# Patient Record
Sex: MALE
[De-identification: names, ages, dates, MRNs, and addresses within clinical notes are randomized per-mention and may not be internally consistent; named-entity substitution may affect disease eponyms.]

---

## 2017-05-27 NOTE — CT
EXAM:

  CT Head Without Intravenous Contrast



CLINICAL HISTORY:

  47 years old, male; Injury or trauma; Fall; Initial encounter; Concussion / 

head injury; Without loss of consciousness; Additional info: (+) ETOH, fall, 

head injury



TECHNIQUE:

  Axial computed tomography images of the head/brain without intravenous 

contrast.  This CT exam was performed using one or more of the following dose 

reduction techniques:  automated exposure control, adjustment of the mA and/or 

kV according to patient size, and/or use of iterative reconstruction technique.

  Coronal and sagittal reformatted images were created and reviewed.



EXAM DATE/TIME:

  Exam ordered 5/27/2017 2:02 AM



COMPARISON:

  No relevant prior studies available.



FINDINGS:

  Brain:  No intracranial hemorrhage.  No significant white matter disease.  No 

edema.

  Ventricles:  Unremarkable.  No ventriculomegaly.

  Bones/joints:  Included portions of the mandible appear intact.  No acute 

fracture.

  Soft tissues: Left frontal extracranial soft tissue swelling.  Question 

prominent left parotid.

  Sinuses:  Paranasal sinuses appear clear.

  Mastoid air cells:  Mastoid air cells appear clear.



IMPRESSION:     

  No fractures, no intracranial hemorrhage.



Physical examination correlation.

## 2017-05-27 NOTE — ED PDOC
HPI: Psych/Substance Abuse


Time Seen by Provider: 05/27/17 01:47


Chief Complaint (Nursing): Alcohol Ingestion


Chief Complaint (Provider): ETOH


History Per: Patient


Additional Complaint(s): 


47 y/o male with PMHx of diastolic CHF, hyperlipidemia, HTN, alcoholic cirrhosis

, schizophrenia and PVD presents to ED after drinking tonight and falling onto 

his face. No LOC. Multiple abrasions noted to face. 











Past Medical History


Reviewed: Historical Data, Nursing Documentation, Vital Signs


Vital Signs: 





 Last Vital Signs











Temp  98.2 F   05/27/17 01:48


 


Pulse  89   05/27/17 01:48


 


Resp  16   05/27/17 01:48


 


BP  157/86 H  05/27/17 01:48


 


Pulse Ox  96   05/27/17 01:48














- Medical History


PMH: Anxiety, Asthma, Bipolar Disorder, CHF, COPD (ASTHMA), Depression, HTN, 

Schizophrenia


   Denies: Alzheimer's Disease, Arthritis, Atrial Fibrillation, Bronchitis, 

Cardia Arrhythmia, Dementia, Emphysema, Hypercholesterolemia, Hypothyroidism, 

Migraine, Mitral Valve Prolapse, Multiple Sclerosis, Parkinson's Disease, 

Peripheral Edema, Pneumonia, Pulmonary Embolism, Chronic Kidney Disease, 

Rheumatoid Arthritis, Seizures, Sleep Apnea, TIA





- Surgical History


Surgical History: Appendectomy


   Denies: Pacemaker





- Family History


Family History: States: Unknown Family Hx





- Social History


Alcohol: > 2 Drinks/Day





- Immunization History


Hx Tetanus Toxoid Vaccination: No


Hx Influenza Vaccination: Yes


Hx Pneumococcal Vaccination: No





- Home Medications


Home Medications: 


 Ambulatory Orders











 Medication  Instructions  Recorded


 


ARIPiprazole [Abilify] 5 mg PO DAILY #0 tab 03/25/16


 


Folic Acid 1 mg PO DAILY #0 tab 03/25/16


 


Lurasidone HCl [Latuda] 40 mg PO HS #0 tablet 03/25/16


 


Midodrine [Proamatine] 5 mg PO TID #0 tab 03/25/16


 


Omeprazole 40 mg PO DAILY #0 capsule. 03/25/16


 


Thiamine [Vitamin B1 Tab] 100 mg PO DAILY #0 tab 03/25/16


 


Aspirin [Aspirin Chewable] 81 mg PO DAILY #30 chew 05/27/16


 


Cilostazol 100 mg PO BID #60 tablet 05/27/16


 


Multivitamins [Hexavitamin] 1 tab PO DAILY #0 tab 05/27/16


 


Spironolactone [Aldactone] 25 mg PO BID #60 tab 05/27/16














- Allergies


Allergies/Adverse Reactions: 


 Allergies











Allergy/AdvReac Type Severity Reaction Status Date / Time


 


No Known Allergies Allergy   Verified 02/22/17 09:10














Review of Systems


ROS Statement: Except As Marked, All Systems Reviewed And Found Negative


Skin: Positive for: Other (abrasions)





Physical Exam





- Reviewed


Nursing Documentation Reviewed: Yes


Vital Signs Reviewed: Yes





- Physical Exam


Appears: Positive for: Well, Non-toxic, No Acute Distress


Head Exam: Positive for: ATRAUMATIC, NORMAL INSPECTION, NORMOCEPHALIC


Skin: Positive for: Normal Color, Warm


Eye Exam: Positive for: EOMI, Normal appearance, PERRL


ENT: Positive for: Normal ENT Inspection, Other (4 cm laceration to upper lip, 

not extending through vermillion boarder.)


Neck: Positive for: Normal, Painless ROM


Cardiovascular/Chest: Positive for: Regular Rate, Rhythm


Respiratory: Positive for: CNT, Normal Breath Sounds


Gastrointestinal/Abdominal: Positive for: Normal Exam, Bowel Sounds, Soft


Back: Positive for: Normal Inspection


Extremity: Positive for: Normal ROM


Neurologic/Psych: Positive for: Alert, Oriented


Comments: 





multiple superficial abrasion to forehead, nasal bridge and cheeks





- ECG


O2 Sat by Pulse Oximetry: 96





Medical Decision Making


Medical Decision Making: 





Head and Maxillofaial CT: Negative, as per [facility]





Facial abrasions cleaned and dressed by writer. Lip laceration repaired, see 

procedure note.











Disposition





- Clinical Impression


Clinical Impression: 


 Alcohol use, Facial laceration, Head injury








- Patient ED Disposition


Is Patient to be Admitted: No





- Disposition


Disposition: Routine/Home


Disposition Time: 05:21


Condition: STABLE


Instructions:  Laceration (ED), Head Injury (ED), Alcohol Intoxication (ED)





Laceration





- Laceration Repair


  ** laceration


Wound Length (In cm): 4


Description Of Wound: Irregular


Wound Cleansed With: Sterile Saline


Anesthesia: Lidocaine 1%


Wound Examination: Irrigated With Saline


Wound Closure: Suture


Suture Technique And Material Used: Vicryl (5-0)


Wound Complexity: Simple

## 2017-05-27 NOTE — ED PDOC
- Laboratory Results


Result Diagrams: 


 05/27/17 11:53





 05/27/17 11:53





- ECG


O2 Sat by Pulse Oximetry: 97





- Progress


Re-evaluation Time: 14:19


Condition: Improved (Awake alert oriented x 3 no focal neuro deficit. Able to 

ambulate. Requesting to be discharged.)





Disposition





- Clinical Impression


Clinical Impression: 


 Alcohol use, Facial laceration, Head injury, Alcohol intoxication








- POA


Present On Arrival: None





- Disposition


Disposition: Routine/Home


Disposition Time: 14:20


Condition: FAIR


Instructions:  Laceration (ED), Head Injury (ED), Alcohol Intoxication (ED)


Print Language: Japanese

## 2017-05-27 NOTE — CT
PROCEDURE:  CT scan maxillofacial skeleton dated 05/27/2017 



HISTORY:

(+) ETOH,. Status post fall.  Head injury. 



COMPARISON:

Comparison made with concurrent CT scan brain 05/27/2017



TECHNIQUE:

Contiguous helical/ transaxial CT  images of the maxillofacial bones 

were obtained. Coronal and sagittal reformats were generated.



Radiation dose:



Total exam DLP = 815.02  mGy-cm.



This CT exam was performed using one or more of the following dose 

reduction techniques: Automated exposure control, adjustment of the 

mA and/or kV according to patient size, and/or use of iterative 

reconstruction technique. 



Findings: 



The current study reveals no definitive evidence of acute 

maxillofacial skeletal fractures. The osseous structures appear 

intact so far as can be seen of.  There is mild soft tissue swelling 

over the left supraorbital and left frontal region. There may also be 

some mild soft tissue swelling over the bridge of the nose, more so 

on the right side. 



The bony orbits are intact.  Globes intact and lenses appropriately 

located.  There are no retrobulbar hemorrhages or collections. Optic 

nerves and extraocular musculature unremarkable. 



The visualized paranasal sinuses well-developed and currently 

well-aerated. No fluid levels seen to suggest acute hemorrhage or 

sinusitis.  Mild mucosal thickening seen in the maxillary antra right 

greater than left. Minor mucosal thickening noted within a few 

ethmoid air cells 



There is mild focal deviation of the nasal septum from left to right. 

Nasal bone and anterior and nasal spine of the maxilla is intact. 



The mandible is intact. 



Impression: No evidence of acute fractures. 



Mild soft tissue swelling left supraorbital and left frontal region 

all with what appears represent small amount of soft tissue swelling 

over the bridge of the nose more so on the right. 



See above discussion for additional incidental details and findings. 

.

## 2017-08-23 ENCOUNTER — HOSPITAL ENCOUNTER (OUTPATIENT)
Dept: HOSPITAL 31 - C.ER | Age: 47
Setting detail: OBSERVATION
LOS: 1 days | Discharge: HOME | End: 2017-08-24
Attending: EMERGENCY MEDICINE | Admitting: EMERGENCY MEDICINE
Payer: MEDICARE

## 2017-08-23 VITALS — BODY MASS INDEX: 47 KG/M2

## 2017-08-23 DIAGNOSIS — F10.120: Primary | ICD-10-CM

## 2017-08-23 DIAGNOSIS — T14.8: ICD-10-CM

## 2017-08-23 DIAGNOSIS — I50.9: ICD-10-CM
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
## 2017-08-23 DIAGNOSIS — F31.9: ICD-10-CM
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
## 2017-08-23 DIAGNOSIS — I11.0: ICD-10-CM

## 2017-08-23 DIAGNOSIS — J44.9: ICD-10-CM

## 2017-08-23 PROCEDURE — 80053 COMPREHEN METABOLIC PANEL: CPT

## 2017-08-23 PROCEDURE — 12004 RPR S/N/AX/GEN/TRK7.6-12.5CM: CPT

## 2017-08-23 PROCEDURE — 85025 COMPLETE CBC W/AUTO DIFF WBC: CPT

## 2017-08-23 PROCEDURE — 94770: CPT

## 2017-08-23 PROCEDURE — 81001 URINALYSIS AUTO W/SCOPE: CPT

## 2017-08-23 PROCEDURE — 90471 IMMUNIZATION ADMIN: CPT

## 2017-08-23 PROCEDURE — 70450 CT HEAD/BRAIN W/O DYE: CPT

## 2017-08-23 PROCEDURE — 90715 TDAP VACCINE 7 YRS/> IM: CPT

## 2017-08-24 VITALS
RESPIRATION RATE: 17 BRPM | DIASTOLIC BLOOD PRESSURE: 80 MMHG | HEART RATE: 82 BPM | TEMPERATURE: 97.9 F | SYSTOLIC BLOOD PRESSURE: 120 MMHG | OXYGEN SATURATION: 99 %

## 2017-08-24 LAB
ALBUMIN/GLOB SERPL: 0.5 {RATIO} (ref 1–2.1)
ALP SERPL-CCNC: 249 U/L (ref 38–126)
ALT SERPL-CCNC: 70 U/L (ref 21–72)
AST SERPL-CCNC: 293 U/L (ref 17–59)
BACTERIA #/AREA URNS HPF: (no result) /[HPF]
BASOPHILS # BLD AUTO: 0 K/UL (ref 0–0.2)
BASOPHILS NFR BLD: 0.5 % (ref 0–2)
BILIRUB SERPL-MCNC: 12 MG/DL (ref 0.2–1.3)
BILIRUB UR-MCNC: (no result) MG/DL
BUN SERPL-MCNC: 2 MG/DL (ref 9–20)
CALCIUM SERPL-MCNC: 7.9 MG/DL (ref 8.6–10.4)
CHLORIDE SERPL-SCNC: 97 MMOL/L (ref 98–107)
CO2 SERPL-SCNC: 16 MMOL/L (ref 22–30)
EOSINOPHIL # BLD AUTO: 0 K/UL (ref 0–0.7)
EOSINOPHIL NFR BLD: 0.4 % (ref 0–4)
ERYTHROCYTE [DISTWIDTH] IN BLOOD BY AUTOMATED COUNT: 27.3 % (ref 11.5–14.5)
ETHANOL SERPL-MCNC: 384 MG/DL (ref 0–10)
GLOBULIN SER-MCNC: 5.5 GM/DL (ref 2.2–3.9)
GLUCOSE SERPL-MCNC: 121 MG/DL (ref 75–110)
GLUCOSE UR STRIP-MCNC: NORMAL MG/DL
HCT VFR BLD CALC: 34.8 % (ref 35–51)
KETONES UR STRIP-MCNC: NEGATIVE MG/DL
LEUKOCYTE ESTERASE UR-ACNC: (no result) LEU/UL
LYMPHOCYTES # BLD AUTO: 0.7 K/UL (ref 1–4.3)
LYMPHOCYTES NFR BLD AUTO: 14.4 % (ref 20–40)
MCH RBC QN AUTO: 31.3 PG (ref 27–31)
MCHC RBC AUTO-ENTMCNC: 34 G/DL (ref 33–37)
MCV RBC AUTO: 92 FL (ref 80–94)
MONOCYTES # BLD: 0.9 K/UL (ref 0–0.8)
MONOCYTES NFR BLD: 17.5 % (ref 0–10)
NRBC BLD AUTO-RTO: 0.2 % (ref 0–2)
PH UR STRIP: 7 [PH] (ref 5–8)
PLATELET # BLD: 73 K/UL (ref 130–400)
PMV BLD AUTO: 8.7 FL (ref 7.2–11.7)
POTASSIUM SERPL-SCNC: 3.5 MMOL/L (ref 3.6–5.2)
PROT SERPL-MCNC: 8.1 G/DL (ref 6.3–8.3)
PROT UR STRIP-MCNC: NEGATIVE MG/DL
RBC # UR STRIP: NEGATIVE /UL
RBC #/AREA URNS HPF: 1 /HPF (ref 0–3)
SODIUM SERPL-SCNC: 133 MMOL/L (ref 132–148)
SP GR UR STRIP: 1 (ref 1–1.03)
UROBILINOGEN UR-MCNC: 2 MG/DL (ref 0.2–1)
WBC # BLD AUTO: 5 K/UL (ref 4.8–10.8)
WBC #/AREA URNS HPF: 2 /HPF (ref 0–5)

## 2017-08-27 ENCOUNTER — HOSPITAL ENCOUNTER (INPATIENT)
Dept: HOSPITAL 31 - C.ER | Age: 47
LOS: 9 days | Discharge: HOME | DRG: 433 | End: 2017-09-05
Attending: INTERNAL MEDICINE | Admitting: INTERNAL MEDICINE
Payer: MEDICARE

## 2017-08-27 DIAGNOSIS — Z87.891: ICD-10-CM

## 2017-08-27 DIAGNOSIS — I10: ICD-10-CM

## 2017-08-27 DIAGNOSIS — K70.31: Primary | ICD-10-CM

## 2017-08-27 DIAGNOSIS — N28.1: ICD-10-CM

## 2017-08-27 DIAGNOSIS — I85.00: ICD-10-CM

## 2017-08-27 DIAGNOSIS — E78.5: ICD-10-CM

## 2017-08-27 DIAGNOSIS — E66.01: ICD-10-CM

## 2017-08-27 DIAGNOSIS — K70.11: ICD-10-CM

## 2017-08-27 LAB
ALBUMIN/GLOB SERPL: 0.5 {RATIO} (ref 1–2.1)
ALP SERPL-CCNC: 225 U/L (ref 38–126)
ALT SERPL-CCNC: 68 U/L (ref 21–72)
APTT BLD: 40 SECONDS (ref 21–34)
AST SERPL-CCNC: 367 U/L (ref 17–59)
BASOPHILS # BLD AUTO: 0 K/UL (ref 0–0.2)
BASOPHILS NFR BLD: 0.3 % (ref 0–2)
BILIRUB SERPL-MCNC: 12.6 MG/DL (ref 0.2–1.3)
BUN SERPL-MCNC: 5 MG/DL (ref 9–20)
CALCIUM SERPL-MCNC: 7.9 MG/DL (ref 8.6–10.4)
CHLORIDE SERPL-SCNC: 97 MMOL/L (ref 98–107)
CO2 SERPL-SCNC: 25 MMOL/L (ref 22–30)
EOSINOPHIL # BLD AUTO: 0 K/UL (ref 0–0.7)
EOSINOPHIL NFR BLD: 0.2 % (ref 0–4)
ERYTHROCYTE [DISTWIDTH] IN BLOOD BY AUTOMATED COUNT: 28.9 % (ref 11.5–14.5)
GLOBULIN SER-MCNC: 5.5 GM/DL (ref 2.2–3.9)
GLUCOSE SERPL-MCNC: 98 MG/DL (ref 75–110)
HCT VFR BLD CALC: 30.3 % (ref 35–51)
INR PPP: 2.4
LYMPHOCYTES # BLD AUTO: 0.6 K/UL (ref 1–4.3)
LYMPHOCYTES NFR BLD AUTO: 13.5 % (ref 20–40)
MCH RBC QN AUTO: 31.6 PG (ref 27–31)
MCHC RBC AUTO-ENTMCNC: 33.8 G/DL (ref 33–37)
MCV RBC AUTO: 93.3 FL (ref 80–94)
MONOCYTES # BLD: 0.8 K/UL (ref 0–0.8)
MONOCYTES NFR BLD: 18.7 % (ref 0–10)
NRBC BLD AUTO-RTO: 0.7 % (ref 0–2)
PLATELET # BLD: 86 K/UL (ref 130–400)
PMV BLD AUTO: 8.5 FL (ref 7.2–11.7)
POTASSIUM SERPL-SCNC: 3.6 MMOL/L (ref 3.6–5.2)
PROT SERPL-MCNC: 8.1 G/DL (ref 6.3–8.3)
SODIUM SERPL-SCNC: 135 MMOL/L (ref 132–148)
WBC # BLD AUTO: 4.2 K/UL (ref 4.8–10.8)

## 2017-08-28 LAB
ALBUMIN/GLOB SERPL: 0.5 {RATIO} (ref 1–2.1)
ALP SERPL-CCNC: 217 U/L (ref 38–126)
ALT SERPL-CCNC: 70 U/L (ref 21–72)
AST SERPL-CCNC: 301 U/L (ref 17–59)
BASOPHILS # BLD AUTO: 0 K/UL (ref 0–0.2)
BASOPHILS NFR BLD: 0.7 % (ref 0–2)
BILIRUB DIRECT SERPL-MCNC: 9.4 MG/DL (ref 0–0.4)
BILIRUB SERPL-MCNC: 11.3 MG/DL (ref 0.2–1.3)
BILIRUB UR-MCNC: (no result) MG/DL
BUN SERPL-MCNC: 6 MG/DL (ref 9–20)
CALCIUM SERPL-MCNC: 7.6 MG/DL (ref 8.6–10.4)
CHLORIDE SERPL-SCNC: 98 MMOL/L (ref 98–107)
CO2 SERPL-SCNC: 26 MMOL/L (ref 22–30)
COLOR UR: (no result)
EOSINOPHIL # BLD AUTO: 0 K/UL (ref 0–0.7)
EOSINOPHIL NFR BLD: 1 % (ref 0–4)
ERYTHROCYTE [DISTWIDTH] IN BLOOD BY AUTOMATED COUNT: 28.8 % (ref 11.5–14.5)
ETHANOL SERPL-MCNC: < 10 MG/DL (ref 0–10)
GLOBULIN SER-MCNC: 5.3 GM/DL (ref 2.2–3.9)
GLUCOSE SERPL-MCNC: 82 MG/DL (ref 75–110)
HCT VFR BLD CALC: 28.2 % (ref 35–51)
KETONES UR STRIP-MCNC: (no result) MG/DL
LEUKOCYTE ESTERASE UR-ACNC: NEGATIVE LEU/UL
LYMPHOCYTES # BLD AUTO: 0.7 K/UL (ref 1–4.3)
LYMPHOCYTES NFR BLD AUTO: 17.2 % (ref 20–40)
MAGNESIUM SERPL-MCNC: 1.2 MG/DL (ref 1.6–2.3)
MCH RBC QN AUTO: 32.1 PG (ref 27–31)
MCHC RBC AUTO-ENTMCNC: 34.5 G/DL (ref 33–37)
MCV RBC AUTO: 93 FL (ref 80–94)
MONOCYTES # BLD: 0.7 K/UL (ref 0–0.8)
MONOCYTES NFR BLD: 17.4 % (ref 0–10)
NRBC BLD AUTO-RTO: 0.1 % (ref 0–2)
PH UR STRIP: 6 [PH] (ref 5–8)
PHOSPHATE SERPL-MCNC: 1.8 MG/DL (ref 2.5–4.5)
PLATELET # BLD: 92 K/UL (ref 130–400)
PMV BLD AUTO: 7.9 FL (ref 7.2–11.7)
POTASSIUM SERPL-SCNC: 3.3 MMOL/L (ref 3.6–5.2)
PROT SERPL-MCNC: 7.7 G/DL (ref 6.3–8.3)
PROT UR STRIP-MCNC: (no result) MG/DL
RBC # UR STRIP: NEGATIVE /UL
RBC #/AREA URNS HPF: 2 /HPF (ref 0–3)
SODIUM SERPL-SCNC: 134 MMOL/L (ref 132–148)
SP GR UR STRIP: 1.06 (ref 1–1.03)
UROBILINOGEN UR-MCNC: 4 MG/DL (ref 0.2–1)
WBC # BLD AUTO: 4.2 K/UL (ref 4.8–10.8)
WBC #/AREA URNS HPF: 4 /HPF (ref 0–5)

## 2017-08-28 RX ADMIN — PREDNISOLONE SODIUM PHOSPHATE SCH: 15 SOLUTION ORAL at 19:25

## 2017-08-28 RX ADMIN — PREDNISOLONE SODIUM PHOSPHATE SCH MG: 15 SOLUTION ORAL at 22:03

## 2017-08-28 RX ADMIN — PANTOPRAZOLE SODIUM SCH MG: 40 TABLET, DELAYED RELEASE ORAL at 10:39

## 2017-08-28 RX ADMIN — MAGNESIUM SULFATE IN DEXTROSE SCH MLS/HR: 10 INJECTION, SOLUTION INTRAVENOUS at 14:28

## 2017-08-28 RX ADMIN — MAGNESIUM SULFATE IN DEXTROSE SCH MLS/HR: 10 INJECTION, SOLUTION INTRAVENOUS at 13:24

## 2017-08-28 RX ADMIN — POTASSIUM CHLORIDE SCH MEQ: 20 TABLET, EXTENDED RELEASE ORAL at 14:29

## 2017-08-29 LAB
ALBUMIN/GLOB SERPL: 0.5 {RATIO} (ref 1–2.1)
ALP SERPL-CCNC: 227 U/L (ref 38–126)
ALT SERPL-CCNC: 73 U/L (ref 21–72)
AST SERPL-CCNC: 297 U/L (ref 17–59)
BILIRUB SERPL-MCNC: 12.5 MG/DL (ref 0.2–1.3)
BUN SERPL-MCNC: 6 MG/DL (ref 9–20)
BUN SERPL-MCNC: 7 MG/DL (ref 9–20)
CALCIUM SERPL-MCNC: 6.6 MG/DL (ref 8.6–10.4)
CALCIUM SERPL-MCNC: 7.5 MG/DL (ref 8.6–10.4)
CHLORIDE SERPL-SCNC: 92 MMOL/L (ref 98–107)
CHLORIDE SERPL-SCNC: 98 MMOL/L (ref 98–107)
CO2 SERPL-SCNC: 21 MMOL/L (ref 22–30)
CO2 SERPL-SCNC: 27 MMOL/L (ref 22–30)
GLOBULIN SER-MCNC: 5.7 GM/DL (ref 2.2–3.9)
GLUCOSE SERPL-MCNC: 132 MG/DL (ref 75–110)
GLUCOSE SERPL-MCNC: 133 MG/DL (ref 75–110)
INR PPP: 2.2
MAGNESIUM SERPL-MCNC: 1.5 MG/DL (ref 1.6–2.3)
POTASSIUM SERPL-SCNC: 3.3 MMOL/L (ref 3.6–5.2)
POTASSIUM SERPL-SCNC: 4.7 MMOL/L (ref 3.6–5.2)
PROT SERPL-MCNC: 8.4 G/DL (ref 6.3–8.3)
SODIUM SERPL-SCNC: 130 MMOL/L (ref 132–148)
SODIUM SERPL-SCNC: 132 MMOL/L (ref 132–148)

## 2017-08-29 RX ADMIN — POTASSIUM CHLORIDE ONE MEQ: 20 TABLET, EXTENDED RELEASE ORAL at 14:07

## 2017-08-29 RX ADMIN — POTASSIUM CHLORIDE SCH: 20 TABLET, EXTENDED RELEASE ORAL at 13:35

## 2017-08-29 RX ADMIN — PANTOPRAZOLE SODIUM SCH MG: 40 TABLET, DELAYED RELEASE ORAL at 09:44

## 2017-08-29 RX ADMIN — POTASSIUM CHLORIDE ONE MEQ: 20 TABLET, EXTENDED RELEASE ORAL at 15:00

## 2017-08-29 RX ADMIN — PREDNISOLONE SODIUM PHOSPHATE SCH: 15 SOLUTION ORAL at 13:35

## 2017-08-29 RX ADMIN — POTASSIUM CHLORIDE SCH MEQ: 20 TABLET, EXTENDED RELEASE ORAL at 16:30

## 2017-08-30 VITALS — RESPIRATION RATE: 20 BRPM

## 2017-08-30 LAB
ALBUMIN/GLOB SERPL: 0.4 {RATIO} (ref 1–2.1)
ALBUMIN/GLOB SERPL: 0.5 {RATIO} (ref 1–2.1)
ALP SERPL-CCNC: 192 U/L (ref 38–126)
ALP SERPL-CCNC: 210 U/L (ref 38–126)
ALT SERPL-CCNC: 68 U/L (ref 21–72)
ALT SERPL-CCNC: 71 U/L (ref 21–72)
AST SERPL-CCNC: 245 U/L (ref 17–59)
AST SERPL-CCNC: 262 U/L (ref 17–59)
BASOPHILS # BLD AUTO: 0 K/UL (ref 0–0.2)
BASOPHILS NFR BLD: 0.4 % (ref 0–2)
BILIRUB SERPL-MCNC: 12.2 MG/DL (ref 0.2–1.3)
BILIRUB SERPL-MCNC: 13.2 MG/DL (ref 0.2–1.3)
BUN SERPL-MCNC: 6 MG/DL (ref 9–20)
BUN SERPL-MCNC: 7 MG/DL (ref 9–20)
CALCIUM SERPL-MCNC: 7.4 MG/DL (ref 8.6–10.4)
CALCIUM SERPL-MCNC: 7.9 MG/DL (ref 8.6–10.4)
CHLORIDE SERPL-SCNC: 94 MMOL/L (ref 98–107)
CHLORIDE SERPL-SCNC: 95 MMOL/L (ref 98–107)
CO2 SERPL-SCNC: 26 MMOL/L (ref 22–30)
CO2 SERPL-SCNC: 27 MMOL/L (ref 22–30)
EOSINOPHIL # BLD AUTO: 0 K/UL (ref 0–0.7)
EOSINOPHIL NFR BLD: 0.3 % (ref 0–4)
ERYTHROCYTE [DISTWIDTH] IN BLOOD BY AUTOMATED COUNT: 29 % (ref 11.5–14.5)
GLOBULIN SER-MCNC: 5.5 GM/DL (ref 2.2–3.9)
GLOBULIN SER-MCNC: 5.7 GM/DL (ref 2.2–3.9)
GLUCOSE SERPL-MCNC: 117 MG/DL (ref 75–110)
GLUCOSE SERPL-MCNC: 79 MG/DL (ref 75–110)
HCT VFR BLD CALC: 31.8 % (ref 35–51)
INR PPP: 2.3
LYMPHOCYTES # BLD AUTO: 0.6 K/UL (ref 1–4.3)
LYMPHOCYTES NFR BLD AUTO: 11.4 % (ref 20–40)
MAGNESIUM SERPL-MCNC: 1.6 MG/DL (ref 1.6–2.3)
MCH RBC QN AUTO: 32.2 PG (ref 27–31)
MCHC RBC AUTO-ENTMCNC: 33.7 G/DL (ref 33–37)
MCV RBC AUTO: 95.4 FL (ref 80–94)
MONOCYTES # BLD: 0.5 K/UL (ref 0–0.8)
MONOCYTES NFR BLD: 9.1 % (ref 0–10)
NRBC BLD AUTO-RTO: 0.1 % (ref 0–2)
PLATELET # BLD: 129 K/UL (ref 130–400)
PMV BLD AUTO: 7.7 FL (ref 7.2–11.7)
POTASSIUM SERPL-SCNC: 2.9 MMOL/L (ref 3.6–5.2)
POTASSIUM SERPL-SCNC: 4 MMOL/L (ref 3.6–5.2)
PROT SERPL-MCNC: 7.9 G/DL (ref 6.3–8.3)
PROT SERPL-MCNC: 8.4 G/DL (ref 6.3–8.3)
SODIUM SERPL-SCNC: 129 MMOL/L (ref 132–148)
SODIUM SERPL-SCNC: 131 MMOL/L (ref 132–148)
WBC # BLD AUTO: 5.4 K/UL (ref 4.8–10.8)

## 2017-08-30 RX ADMIN — POTASSIUM CHLORIDE SCH MEQ: 20 TABLET, EXTENDED RELEASE ORAL at 09:01

## 2017-08-30 RX ADMIN — PANTOPRAZOLE SODIUM SCH MG: 40 TABLET, DELAYED RELEASE ORAL at 09:02

## 2017-08-30 RX ADMIN — PREDNISOLONE SODIUM PHOSPHATE SCH MG: 15 SOLUTION ORAL at 09:05

## 2017-08-31 LAB
ALBUMIN/GLOB SERPL: 0.5 {RATIO} (ref 1–2.1)
ALP SERPL-CCNC: 228 U/L (ref 38–126)
ALT SERPL-CCNC: 79 U/L (ref 21–72)
AST SERPL-CCNC: 256 U/L (ref 17–59)
BASOPHILS # BLD AUTO: 0 K/UL (ref 0–0.2)
BASOPHILS NFR BLD: 0.3 % (ref 0–2)
BILIRUB SERPL-MCNC: 14.2 MG/DL (ref 0.2–1.3)
BUN SERPL-MCNC: 7 MG/DL (ref 9–20)
CALCIUM SERPL-MCNC: 8.3 MG/DL (ref 8.6–10.4)
CHLORIDE SERPL-SCNC: 95 MMOL/L (ref 98–107)
CO2 SERPL-SCNC: 26 MMOL/L (ref 22–30)
EOSINOPHIL # BLD AUTO: 0.1 K/UL (ref 0–0.7)
EOSINOPHIL NFR BLD: 0.8 % (ref 0–4)
ERYTHROCYTE [DISTWIDTH] IN BLOOD BY AUTOMATED COUNT: 29.3 % (ref 11.5–14.5)
GLOBULIN SER-MCNC: 5.9 GM/DL (ref 2.2–3.9)
GLUCOSE SERPL-MCNC: 106 MG/DL (ref 75–110)
HCT VFR BLD CALC: 33.5 % (ref 35–51)
LKM-1 AB (IGG): <=20 U (ref ?–20)
LYMPHOCYTES # BLD AUTO: 1.3 K/UL (ref 1–4.3)
LYMPHOCYTES NFR BLD AUTO: 17.3 % (ref 20–40)
MCH RBC QN AUTO: 32.1 PG (ref 27–31)
MCHC RBC AUTO-ENTMCNC: 33.4 G/DL (ref 33–37)
MCV RBC AUTO: 96.3 FL (ref 80–94)
MONOCYTES # BLD: 1.1 K/UL (ref 0–0.8)
MONOCYTES NFR BLD: 14.5 % (ref 0–10)
NRBC BLD AUTO-RTO: 0.1 % (ref 0–2)
PLATELET # BLD: 143 K/UL (ref 130–400)
PMV BLD AUTO: 7.5 FL (ref 7.2–11.7)
POTASSIUM SERPL-SCNC: 3.3 MMOL/L (ref 3.6–5.2)
PROT SERPL-MCNC: 8.8 G/DL (ref 6.3–8.3)
SODIUM SERPL-SCNC: 131 MMOL/L (ref 132–148)
WBC # BLD AUTO: 7.5 K/UL (ref 4.8–10.8)

## 2017-08-31 RX ADMIN — PREDNISOLONE SODIUM PHOSPHATE SCH MG: 15 SOLUTION ORAL at 10:31

## 2017-08-31 RX ADMIN — PANTOPRAZOLE SODIUM SCH MG: 40 TABLET, DELAYED RELEASE ORAL at 09:52

## 2017-08-31 RX ADMIN — POTASSIUM CHLORIDE SCH: 20 TABLET, EXTENDED RELEASE ORAL at 13:38

## 2017-08-31 RX ADMIN — POTASSIUM CHLORIDE SCH MEQ: 20 TABLET, EXTENDED RELEASE ORAL at 09:52

## 2017-09-01 LAB
ALBUMIN/GLOB SERPL: 0.5 {RATIO} (ref 1–2.1)
ALP SERPL-CCNC: 235 U/L (ref 38–126)
ALT SERPL-CCNC: 82 U/L (ref 21–72)
AST SERPL-CCNC: 273 U/L (ref 17–59)
BASOPHILS # BLD AUTO: 0 K/UL (ref 0–0.2)
BASOPHILS NFR BLD: 0.3 % (ref 0–2)
BILIRUB SERPL-MCNC: 16.5 MG/DL (ref 0.2–1.3)
BUN SERPL-MCNC: 10 MG/DL (ref 9–20)
CALCIUM SERPL-MCNC: 8.8 MG/DL (ref 8.6–10.4)
CHLORIDE SERPL-SCNC: 95 MMOL/L (ref 98–107)
CO2 SERPL-SCNC: 29 MMOL/L (ref 22–30)
EOSINOPHIL # BLD AUTO: 0.1 K/UL (ref 0–0.7)
EOSINOPHIL NFR BLD: 0.8 % (ref 0–4)
ERYTHROCYTE [DISTWIDTH] IN BLOOD BY AUTOMATED COUNT: 29.3 % (ref 11.5–14.5)
GLOBULIN SER-MCNC: 6.2 GM/DL (ref 2.2–3.9)
GLUCOSE SERPL-MCNC: 88 MG/DL (ref 75–110)
HCT VFR BLD CALC: 35.5 % (ref 35–51)
LYMPHOCYTES # BLD AUTO: 1.5 K/UL (ref 1–4.3)
LYMPHOCYTES NFR BLD AUTO: 18.6 % (ref 20–40)
MAGNESIUM SERPL-MCNC: 1.6 MG/DL (ref 1.6–2.3)
MCH RBC QN AUTO: 32.9 PG (ref 27–31)
MCHC RBC AUTO-ENTMCNC: 33.7 G/DL (ref 33–37)
MCV RBC AUTO: 97.5 FL (ref 80–94)
MONOCYTES # BLD: 1 K/UL (ref 0–0.8)
MONOCYTES NFR BLD: 12.8 % (ref 0–10)
NRBC BLD AUTO-RTO: 0.3 % (ref 0–2)
PHOSPHATE SERPL-MCNC: 2.7 MG/DL (ref 2.5–4.5)
PLATELET # BLD: 183 K/UL (ref 130–400)
PMV BLD AUTO: 7.6 FL (ref 7.2–11.7)
POTASSIUM SERPL-SCNC: 3.7 MMOL/L (ref 3.6–5.2)
PROT SERPL-MCNC: 9.4 G/DL (ref 6.3–8.3)
SODIUM SERPL-SCNC: 133 MMOL/L (ref 132–148)
WBC # BLD AUTO: 8.2 K/UL (ref 4.8–10.8)

## 2017-09-01 RX ADMIN — POTASSIUM CHLORIDE SCH MEQ: 20 TABLET, EXTENDED RELEASE ORAL at 10:42

## 2017-09-01 RX ADMIN — POTASSIUM CHLORIDE SCH: 20 TABLET, EXTENDED RELEASE ORAL at 11:38

## 2017-09-01 RX ADMIN — PANTOPRAZOLE SODIUM SCH MG: 40 TABLET, DELAYED RELEASE ORAL at 10:42

## 2017-09-01 RX ADMIN — PREDNISOLONE SODIUM PHOSPHATE SCH MG: 15 SOLUTION ORAL at 10:44

## 2017-09-02 RX ADMIN — PREDNISOLONE SODIUM PHOSPHATE SCH MG: 15 SOLUTION ORAL at 10:27

## 2017-09-02 RX ADMIN — PANTOPRAZOLE SODIUM SCH MG: 40 TABLET, DELAYED RELEASE ORAL at 10:27

## 2017-09-02 RX ADMIN — POTASSIUM CHLORIDE SCH MEQ: 20 TABLET, EXTENDED RELEASE ORAL at 10:27

## 2017-09-03 RX ADMIN — PREDNISOLONE SODIUM PHOSPHATE SCH MG: 15 SOLUTION ORAL at 10:24

## 2017-09-03 RX ADMIN — PANTOPRAZOLE SODIUM SCH MG: 40 TABLET, DELAYED RELEASE ORAL at 10:24

## 2017-09-03 RX ADMIN — POTASSIUM CHLORIDE SCH MEQ: 20 TABLET, EXTENDED RELEASE ORAL at 10:24

## 2017-09-04 LAB
ALBUMIN/GLOB SERPL: 0.5 {RATIO} (ref 1–2.1)
ALP SERPL-CCNC: 220 U/L (ref 38–126)
ALT SERPL-CCNC: 83 U/L (ref 21–72)
AST SERPL-CCNC: 221 U/L (ref 17–59)
BILIRUB DIRECT SERPL-MCNC: 6.9 MG/DL (ref 0–0.4)
BILIRUB SERPL-MCNC: 9.5 MG/DL (ref 0.2–1.3)
GLOBULIN SER-MCNC: 5.8 GM/DL (ref 2.2–3.9)
PROT SERPL-MCNC: 8.7 G/DL (ref 6.3–8.3)

## 2017-09-04 RX ADMIN — POTASSIUM CHLORIDE SCH MEQ: 20 TABLET, EXTENDED RELEASE ORAL at 09:40

## 2017-09-04 RX ADMIN — PREDNISOLONE SODIUM PHOSPHATE SCH MG: 15 SOLUTION ORAL at 09:41

## 2017-09-04 RX ADMIN — PANTOPRAZOLE SODIUM SCH MG: 40 TABLET, DELAYED RELEASE ORAL at 09:41

## 2017-09-05 VITALS — DIASTOLIC BLOOD PRESSURE: 69 MMHG | SYSTOLIC BLOOD PRESSURE: 115 MMHG

## 2017-09-05 VITALS — TEMPERATURE: 98.5 F | OXYGEN SATURATION: 99 % | HEART RATE: 60 BPM

## 2017-09-05 LAB
ALBUMIN/GLOB SERPL: 0.4 {RATIO} (ref 1–2.1)
ALP SERPL-CCNC: 158 U/L (ref 38–126)
ALT SERPL-CCNC: 73 U/L (ref 21–72)
AST SERPL-CCNC: 155 U/L (ref 17–59)
BILIRUB SERPL-MCNC: 7.3 MG/DL (ref 0.2–1.3)
BUN SERPL-MCNC: 13 MG/DL (ref 9–20)
CALCIUM SERPL-MCNC: 8.1 MG/DL (ref 8.6–10.4)
CHLORIDE SERPL-SCNC: 102 MMOL/L (ref 98–107)
CO2 SERPL-SCNC: 24 MMOL/L (ref 22–30)
ERYTHROCYTE [DISTWIDTH] IN BLOOD BY AUTOMATED COUNT: 28.6 % (ref 11.5–14.5)
GLOBULIN SER-MCNC: 4.9 GM/DL (ref 2.2–3.9)
GLUCOSE SERPL-MCNC: 79 MG/DL (ref 75–110)
HCT VFR BLD CALC: 30.2 % (ref 35–51)
MCH RBC QN AUTO: 34.2 PG (ref 27–31)
MCHC RBC AUTO-ENTMCNC: 33.8 G/DL (ref 33–37)
MCV RBC AUTO: 101.2 FL (ref 80–94)
PLATELET # BLD: 105 K/UL (ref 130–400)
PMV BLD AUTO: 8.1 FL (ref 7.2–11.7)
POTASSIUM SERPL-SCNC: 4.2 MMOL/L (ref 3.6–5.2)
PROT SERPL-MCNC: 7.2 G/DL (ref 6.3–8.3)
SODIUM SERPL-SCNC: 136 MMOL/L (ref 132–148)
WBC # BLD AUTO: 5.2 K/UL (ref 4.8–10.8)

## 2017-09-05 RX ADMIN — POTASSIUM CHLORIDE SCH MEQ: 20 TABLET, EXTENDED RELEASE ORAL at 09:10

## 2017-09-05 RX ADMIN — PREDNISOLONE SODIUM PHOSPHATE SCH MG: 15 SOLUTION ORAL at 09:09

## 2017-09-05 RX ADMIN — PANTOPRAZOLE SODIUM SCH MG: 40 TABLET, DELAYED RELEASE ORAL at 09:10

## 2017-09-30 ENCOUNTER — HOSPITAL ENCOUNTER (INPATIENT)
Dept: HOSPITAL 14 - H.ER | Age: 47
LOS: 1 days | Discharge: LEFT BEFORE DISCHARGE | DRG: 440 | End: 2017-10-01
Attending: INTERNAL MEDICINE | Admitting: INTERNAL MEDICINE
Payer: MEDICARE

## 2017-09-30 VITALS — BODY MASS INDEX: 44.4 KG/M2

## 2017-09-30 DIAGNOSIS — F31.9: ICD-10-CM

## 2017-09-30 DIAGNOSIS — Z23: ICD-10-CM

## 2017-09-30 DIAGNOSIS — I11.0: ICD-10-CM

## 2017-09-30 DIAGNOSIS — Z79.899: ICD-10-CM

## 2017-09-30 DIAGNOSIS — M19.90: ICD-10-CM

## 2017-09-30 DIAGNOSIS — K74.60: ICD-10-CM

## 2017-09-30 DIAGNOSIS — F20.9: ICD-10-CM

## 2017-09-30 DIAGNOSIS — J44.9: ICD-10-CM

## 2017-09-30 DIAGNOSIS — I50.9: ICD-10-CM

## 2017-09-30 DIAGNOSIS — K85.90: Primary | ICD-10-CM

## 2017-09-30 LAB
ALBUMIN/GLOB SERPL: 0.6 {RATIO} (ref 1–2.1)
ALP SERPL-CCNC: 245 U/L (ref 38–126)
ALT SERPL-CCNC: 61 U/L (ref 21–72)
APTT BLD: 34.6 SECONDS (ref 25.6–37.1)
AST SERPL-CCNC: 119 U/L (ref 17–59)
BASE EXCESS BLDV CALC-SCNC: -0.9 MMOL/L (ref 0–2)
BASE EXCESS BLDV CALC-SCNC: 1.7 MMOL/L (ref 0–2)
BASOPHILS # BLD AUTO: 0 K/UL (ref 0–0.2)
BASOPHILS NFR BLD: 0.3 % (ref 0–2)
BILIRUB SERPL-MCNC: 4.7 MG/DL (ref 0.2–1.3)
BILIRUB UR-MCNC: NEGATIVE MG/DL
BUN SERPL-MCNC: 8 MG/DL (ref 9–20)
CALCIUM SERPL-MCNC: 8.5 MG/DL (ref 8.4–10.2)
CHLORIDE SERPL-SCNC: 99 MMOL/L (ref 98–107)
CO2 SERPL-SCNC: 22 MMOL/L (ref 22–30)
COLOR UR: YELLOW
EOSINOPHIL # BLD AUTO: 0 K/UL (ref 0–0.7)
EOSINOPHIL NFR BLD: 0.9 % (ref 0–4)
ERYTHROCYTE [DISTWIDTH] IN BLOOD BY AUTOMATED COUNT: 17 % (ref 11.5–14.5)
ETHANOL SERPL-MCNC: < 10 MG/DL (ref 0–10)
GLOBULIN SER-MCNC: 5.4 GM/DL (ref 2.2–3.9)
GLUCOSE SERPL-MCNC: 124 MG/DL (ref 75–110)
GLUCOSE UR STRIP-MCNC: (no result) MG/DL
HCT VFR BLD CALC: 36.5 % (ref 35–51)
KETONES UR STRIP-MCNC: NEGATIVE MG/DL
LEUKOCYTE ESTERASE UR-ACNC: (no result) LEU/UL
LIPASE SERPL-CCNC: 3683 U/L (ref 23–300)
LYMPHOCYTES # BLD AUTO: 0.8 K/UL (ref 1–4.3)
LYMPHOCYTES NFR BLD AUTO: 15.3 % (ref 20–40)
MAGNESIUM SERPL-MCNC: 1.2 MG/DL (ref 1.6–2.3)
MCH RBC QN AUTO: 35.4 PG (ref 27–31)
MCHC RBC AUTO-ENTMCNC: 33.5 G/DL (ref 33–37)
MCV RBC AUTO: 105.5 FL (ref 80–94)
MONOCYTES # BLD: 0.4 K/UL (ref 0–0.8)
MONOCYTES NFR BLD: 7.2 % (ref 0–10)
NEUTROPHILS # BLD: 3.9 K/UL (ref 1.8–7)
NEUTROPHILS NFR BLD AUTO: 76.3 % (ref 50–75)
NRBC BLD AUTO-RTO: 0.2 % (ref 0–0)
PCO2 BLDV: 48 MMHG (ref 40–60)
PCO2 BLDV: 50 MMHG (ref 40–60)
PH BLDV: 7.32 [PH] (ref 7.32–7.43)
PH BLDV: 7.37 [PH] (ref 7.32–7.43)
PH UR STRIP: 7 [PH] (ref 5–8)
PLATELET # BLD: 152 K/UL (ref 130–400)
PMV BLD AUTO: 8.3 FL (ref 7.2–11.7)
POTASSIUM SERPL-SCNC: 4.2 MMOL/L (ref 3.6–5)
PROT SERPL-MCNC: 8.5 G/DL (ref 6.3–8.2)
PROT UR STRIP-MCNC: NEGATIVE MG/DL
RBC # UR STRIP: NEGATIVE /UL
RBC #/AREA URNS HPF: 1 /HPF (ref 0–3)
SODIUM SERPL-SCNC: 135 MMOL/L (ref 132–148)
SP GR UR STRIP: 1.01 (ref 1–1.03)
UROBILINOGEN UR-MCNC: (no result) MG/DL (ref 0.2–1)
WBC # BLD AUTO: 5.2 K/UL (ref 4.8–10.8)
WBC #/AREA URNS HPF: 1 /HPF (ref 0–5)

## 2017-09-30 PROCEDURE — 3E0234Z INTRODUCTION OF SERUM, TOXOID AND VACCINE INTO MUSCLE, PERCUTANEOUS APPROACH: ICD-10-PCS | Performed by: INTERNAL MEDICINE

## 2017-09-30 RX ADMIN — PIPERACILLIN SODIUM AND TAZOBACTAM SODIUM SCH MLS/HR: .5; 4 INJECTION, POWDER, LYOPHILIZED, FOR SOLUTION INTRAVENOUS at 13:59

## 2017-09-30 RX ADMIN — PIPERACILLIN SODIUM AND TAZOBACTAM SODIUM SCH MLS/HR: .5; 4 INJECTION, POWDER, LYOPHILIZED, FOR SOLUTION INTRAVENOUS at 21:37

## 2017-09-30 NOTE — CT
PROCEDURE:  CT Abdomen and Pelvis with contrast



HISTORY:

pancreatitis



COMPARISON:

None.



TECHNIQUE:

Contrast dose: Omnipaque 300, 95 cc



Radiation dose:



Total exam DLP = 1086.98 mGy-cm.



This CT exam was performed using one or more of the following dose 

reduction techniques: Automated exposure control, adjustment of the 

mA and/or kV according to patient size, and/or use of iterative 

reconstruction technique.



FINDINGS:



LOWER THORAX:

Cardiomegaly is worsened with marked esophageal varices again 

evident. 



LIVER:

Enlarged cirrhotic liver is appreciated with evidence of hepatofugal 

blood flow including the aforementioned esophageal varices, but also 

gastrohepatic and spinal renal varices as well. 



GALLBLADDER AND BILE DUCTS:

Gallbladder is distended with mild mural thickening which is 

nonspecific. Prior dependent cholelithiasis is now not identified in 

the gallbladder. The common bile duct is dilated up to 10 mm 

proximally tapering to 7 mm distally without radiodense 

choledocholithiasis. 



PANCREAS:

 Pancreatic duct is normal caliber. Subtle peripancreatic reaction is 

questioned but is not definite and may indicate an element of 

pancreatitis.  Clinically correlate. 



SPLEEN:

Splenomegaly is noted to 16.5 cm, increased in the interval. 



ADRENALS:

Unremarkable. No mass. 



KIDNEYS AND URETERS:

Unremarkable. No hydronephrosis. No solid mass. 



VASCULATURE:

Unremarkable. No aortic aneurysm. 



BOWEL:

Unremarkable. No obstruction. No gross mural thickening. 



APPENDIX:

Not clearly identified. 



PERITONEUM:

Mild central lymphadenopathy is appreciated with ground-glass opacity 

suspicious for mesenteric adenitis which complicates the potential 

diagnosis of pancreatitis by CT criteria and clinical correlation is 

advised. 



LYMPH NODES:

Mildly enlarged central mesenteric lymph nodes as noted in the 

peritoneum section above. 



BLADDER:

Thick-walled urinary bladder is appreciated anteriorly which could 

reflect cystitis or even neoplasm though the bladder is not fully 

distended.  Clinically correlate further. 



REPRODUCTIVE:

Unremarkable. 



BONES:

No acute fracture. 



OTHER FINDINGS:

None.



IMPRESSION:

1. Hepatomegaly with evidence of the hepatofugal blood flow including 

esophageal, gastrohepatic ligament and low splenorenal varices. 



2. Borderline CT pancreatitis pattern however this complicated by the 

presence of what may be mesenteric adenitis and clinical correlation 

is advised further. 



3. Dilated common bile duct is appreciated in the interval of 

indeterminate etiology.  Consider potential lucent 

choledocholithiasis, distal CBD stricture pancreatic head lesion or 

possible axillary lesion has no radiodense cholelithiasis identified. 

Prior radiodense cholelithiasis in the the dependent gallbladder is 

not identified currently. 



4. Other lesser findings as discussed above.

## 2017-09-30 NOTE — ED PDOC
Syncope/Near Syncope/Dizziness


Time Seen by Provider: 09/30/17 10:46


Chief Complaint (Nursing): Abdominal Pain


Chief Complaint (Provider): Abdominal Pain


History Per: Patient


History/Exam Limitations: no limitations


Onset/Duration Of Symptoms: Days (x1)


Current Symptoms Are (Timing): Still Present


Additional Complaint(s): 





Kimberly Payton is a 47 year old male with previous medical history of cirrhosis

, who presents to the emergency department with a complaint of upper abdominal 

pain associated with chills ongoing for 1 day. Denied any fevers or vomiting. 





PMD: none provided





Past Medical History


Reviewed: Historical Data, Nursing Documentation, Vital Signs


Vital Signs: 





 Last Vital Signs











Temp  98.7 F   09/30/17 10:48


 


Pulse  76   09/30/17 10:48


 


Resp  18   09/30/17 10:48


 


BP  104/56 L  09/30/17 10:48


 


Pulse Ox  98   09/30/17 11:05














- Medical History


PMH: Anxiety, Arthritis (BACK,KNEES), Asthma, Bipolar Disorder, CHF, COPD (

ASTHMA), Depression, HTN, Schizophrenia


   Denies: Alzheimer's Disease, Anemia, Dementia, HIV, Hypothyroidism, Migraine

, Multiple Sclerosis, Parkinson's Disease, Chronic Kidney Disease, Rheumatoid 

Arthritis, Seizures, Sickle Cell Disease, TIA





- Surgical History


Surgical History: Appendectomy


   Denies: CABG, Carotid Endarterectomy, Cholecystectomy, Coronary Stent, 

Pacemaker, Tonsillectomy





- Family History


Family History: States: Unknown Family Hx





- Immunization History


Hx Tetanus Toxoid Vaccination: No


Hx Influenza Vaccination: Yes


Hx Pneumococcal Vaccination: No





- Home Medications


Home Medications: 


 Ambulatory Orders











 Medication  Instructions  Recorded


 


ARIPiprazole [Abilify] 5 mg PO DAILY #0 tab 03/25/16


 


Folic Acid 1 mg PO DAILY #0 tab 03/25/16


 


Lurasidone HCl [Latuda] 40 mg PO HS #0 tablet 03/25/16


 


Midodrine [Proamatine] 5 mg PO TID #0 tab 03/25/16


 


Omeprazole 40 mg PO DAILY #0 capsule. 03/25/16


 


Thiamine [Vitamin B1 Tab] 100 mg PO DAILY #0 tab 03/25/16


 


Aspirin [Aspirin Chewable] 81 mg PO DAILY #30 tab 06/06/17


 


Cilostazol [Pletal] 100 mg PO BID #60 tab 06/06/17


 


Furosemide 40 mg PO DAILY #30 tablet 09/05/17


 


Gabapentin [Neurontin] 400 mg PO TID #90 cap 09/05/17


 


Lactulose [Enulose] 30 gm PO BID #30 09/05/17


 


Spironolactone [Aldactone] 100 mg PO DAILY #30 tab 09/05/17


 


rifAXIMin [Xifaxan] 550 mg PO BID #30 tab 09/05/17


 


Albuterol HFA [Ventolin HFA 90 2 puff IN Q6 PRN 09/30/17





mcg/actuation (8 g)]  














- Allergies


Allergies/Adverse Reactions: 


 Allergies











Allergy/AdvReac Type Severity Reaction Status Date / Time


 


No Known Allergies Allergy   Verified 09/30/17 11:04














Review of Systems


ROS Statement: Except As Marked, All Systems Reviewed And Found Negative


Constitutional: Positive for: Chills.  Negative for: Fever


Gastrointestinal: Positive for: Abdominal Pain (epigastric).  Negative for: 

Vomiting





Physical Exam





- Reviewed


Nursing Documentation Reviewed: Yes


Vital Signs Reviewed: Yes





- Physical Exam


Appears: Positive for: Well, Non-toxic, No Acute Distress


Head Exam: Positive for: ATRAUMATIC, NORMAL INSPECTION, NORMOCEPHALIC


Skin: Positive for: Normal Color


Eye Exam: Positive for: Normal appearance


ENT: Positive for: Normal ENT Inspection


Neck: Positive for: Normal


Cardiovascular/Chest: Positive for: Regular Rate, Rhythm.  Negative for: Chest 

Non Tender


Respiratory: Positive for: Normal Breath Sounds, Accessory Muscle Use.  

Negative for: Decreased Breath Sounds, Respiratory Distress


Gastrointestinal/Abdominal: Positive for: Tenderness (epigastric), Asicites.  

Negative for: Normal Exam


Back: Positive for: Normal Inspection.  Negative for: L CVA Tenderness, R CVA 

Tenderness


Extremity: Positive for: Normal ROM.  Negative for: Tenderness, Pedal Edema, 

Deformity


Neurologic/Psych: Positive for: Alert, Oriented





- Laboratory Results


Result Diagrams: 


 09/30/17 11:40





 09/30/17 11:40





- ECG


O2 Sat by Pulse Oximetry: 98 (RA)


Pulse Ox Interpretation: Normal





- Progress


ED Course And Treament: 











zosyn 4.5 gm iv x 1 dose in ED





ns 1 liter wide open





pepcid 20 mg iv x 1 dose





lipase noted elevated.  d/w Dr. Farooq





CT abd/pelvis ordered.





morphine 4 mg i vx 1 dose for pain





d/w Dr. Rosas for admission.





US results reviewed with DR. choe, he will f/u with CT evaluation.





Medical Decision Making


Medical Decision Making: 





Initial Impression: Abdominal pain





Initial Plan:


* VBG


* EKG


* Alcohol serum


* Ammonia


* CMP


* Lipase


* Magnesium


* Troponin I


* CBC


* PTT


* PT


* CXR


* Blood culture 


* Urinalysis


* US ABD





Time: 1413


--US ABD


FINDINGS:


LIVER:


Measures 12.6 cm in length. There is no increased echogenicity of the hepatic 

parenchyma as prior CT previously demonstrated fatty liver changes delete. 

Prior fatty liver changes are not identified sonographically at this time. No 

mass. No intrahepatic bile duct dilatation. Hepatofugal blood flow suggested on 

color ultrasound. There is a borderline nodular peripheral hepatic pattern may 

indicate cirrhosis. 


GALLBLADDER:


Gallbladder appears prominently distended, however, there is no month 

sonographic Causey sign, cholelithiasis, mural thickening or pericholecystic 

fluid collection identified. 


COMMON BILE DUCT:


Measures 8.9 mm.  No stones.


PANCREAS:


The pancreas not visualized to our prominent overlying bowel gas.


RIGHT KIDNEY:


Measures 11.6 cm in length. Normal echogenicity. No calculus, mass, or 

hydronephrosis.


AORTA:


No aneurysmal dilatation.


IVC:


Unremarkable.


OTHER FINDINGS:


None .





IMPRESSION:


-Abnormal dilatation of the common bile duct is identified up to 8.9 mm without 

choledocholithiasis identified. The gallbladder is markedly distended without 

mural thickening or cholelithiasis evident. The pancreas is obscured by 

overlying bowel gas.


-Borderline cirrhosis pattern however there is hepatofugal blood flow by color 

Doppler ultrasound. Further clinical correlation advised.








--------------------------------------------------------------------------------

-----------------


Scribe Attestation:


Documented by Mary Ann Gatica, acting as a scribe for Sharlene Ruiz PA-C.





Provider Scribe Attestation:


All medical record entries made by the Scribe were at my direction and 

personally dictated by me. I have reviewed the chart and agree that the record 

accurately reflects my personal performance of the history, physical exam, 

medical decision making, and the department course for this patient. I have 

also personally directed, reviewed, and agree with the discharge instructions 

and disposition.





Disposition





- Clinical Impression


Clinical Impression: 


 Pancreatitis








- Patient ED Disposition


Is Patient to be Admitted: Yes





- Disposition


Disposition Time: 15:15


Condition: FAIR





- Pt Status Changed To:


Hospital Disposition Of: Inpatient





- Admit Certification


Admit to Inpatient:: After my assessment, the patient will require 

hospitalization for at least two midnights.  This is because of the severity of 

symptoms shown, intensity of services needed, and/or the medical risk in this 

patient being treated as an outpatient.

## 2017-09-30 NOTE — US
HISTORY:

EVALUATION ASCITES/GALLBLADDER



COMPARISON:

Abdomen pelvis CT 03/17/2016.



TECHNIQUE:

Sonographic evaluation of the right upper quadrant of the abdomen.



FINDINGS:



LIVER:

Measures 12.6 cm in length. There is no increased echogenicity of the 

hepatic parenchyma as prior CT previously demonstrated fatty liver 

changes delete. Prior fatty liver changes are not identified 

sonographically at this time. No mass. No intrahepatic bile duct 

dilatation. Hepatofugal blood flow suggested on color ultrasound. 

There is a borderline nodular peripheral hepatic pattern may indicate 

cirrhosis. 



GALLBLADDER:

Gallbladder appears prominently distended, however, there is no month 

sonographic Causey sign, cholelithiasis, mural thickening or 

pericholecystic fluid collection identified. 



COMMON BILE DUCT:

Measures 8.9 mm.  No stones.



PANCREAS:

The pancreas not visualized to our prominent overlying bowel gas.



RIGHT KIDNEY:

Measures 11.6 cm in length. Normal echogenicity. No calculus, mass, 

or hydronephrosis.



AORTA:

No aneurysmal dilatation.



IVC:

Unremarkable.



OTHER FINDINGS:

None .



IMPRESSION:

Abnormal dilatation of the common bile duct is identified up to 8.9 

mm without choledocholithiasis identified. The gallbladder is 

markedly distended without mural thickening or cholelithiasis 

evident. The pancreas is obscured by overlying bowel gas.



Borderline cirrhosis pattern however there is hepatofugal blood flow 

by color Doppler ultrasound. Further clinical correlation advised.

## 2017-09-30 NOTE — RAD
HISTORY:

ABD PAIN  



COMPARISON:

Portable chest 03/17/2016. 



FINDINGS:



LUNGS:

No active pulmonary disease.



PLEURA:

No significant pleural effusion identified, no pneumothorax apparent.



CARDIOVASCULAR:

Stable cardiomegaly. No pulmonary vascular derangement appreciated.



OSSEOUS STRUCTURES:

No significant abnormalities.



VISUALIZED UPPER ABDOMEN:

Normal.



OTHER FINDINGS:

None.



IMPRESSION:

No interval acute cardiopulmonary disease appreciated. Stable 

cardiomegaly as compared to prior chest 03/17/2016.

## 2017-10-01 VITALS — HEART RATE: 71 BPM | TEMPERATURE: 98.6 F | OXYGEN SATURATION: 97 %

## 2017-10-01 VITALS — SYSTOLIC BLOOD PRESSURE: 115 MMHG | DIASTOLIC BLOOD PRESSURE: 71 MMHG

## 2017-10-01 VITALS — RESPIRATION RATE: 18 BRPM

## 2017-10-01 LAB
LIPASE SERPL-CCNC: 1156 U/L (ref 23–300)
TSH SERPL-ACNC: 2.15 MIU/ML (ref 0.46–4.68)

## 2017-10-01 RX ADMIN — PIPERACILLIN SODIUM AND TAZOBACTAM SODIUM SCH MLS/HR: .5; 4 INJECTION, POWDER, LYOPHILIZED, FOR SOLUTION INTRAVENOUS at 00:22

## 2017-10-01 RX ADMIN — PIPERACILLIN SODIUM AND TAZOBACTAM SODIUM SCH MLS/HR: .5; 4 INJECTION, POWDER, LYOPHILIZED, FOR SOLUTION INTRAVENOUS at 05:52

## 2017-10-02 NOTE — HP
CHIEF COMPLAINT:  Abdominal pain.



HISTORY OF PRESENT ILLNESS:  This is a 47-year-old male, known case of

cirrhosis of liver, who was having upper abdominal pain for about 1 day

duration, so the patient was brought to the emergency room and was admitted

for further management.



REVIEW OF SYSTEMS:  Positive for abdominal pain.  Review of system

otherwise is negative for headache, dizziness, syncope, loss of

consciousness, chest pain, shortness of breath, nausea, vomiting, diarrhea,

constipation, anemia, joint or extremity pain.  Review of systems is also

positive for chills.



PAST MEDICAL HISTORY:  Significant for anxiety, arthritis, asthma, bipolar

disorder, COPD, CHF, depression, cirrhosis, hypertension and schizophrenia.



PERSONAL HISTORY:  The patient is currently nonsmoker, nondrinker.  No

substance abuse, but has a history of similar in the past.



PERSONAL FAMILY HISTORY:  Noncontributory.



MEDICATIONS:  The patient is on multiple medications, which includes

Abilify, folic acid, Latuda, midodrine, omeprazole, thiamine, aspirin,

Pletal, Lasix, Neurontin, lactulose, Aldactone, Zyprexa and albuterol.



ALLERGIES:  The patient is not allergic to any medications.



PHYSICAL EXAMINATION:

GENERAL:  Well-built, well-nourished, morbidly obese male, in no acute

distress.

VITAL SIGNS:  Temperature 98.6, pulse 71, respirations 18, and blood

pressure 126/74.

HEENT:  Pupils reacting to light.  Normocephalic, atraumatic skull.

NECK:  No JVD.  No thyromegaly.  No lymphadenopathy.  No nystagmus.

HEART:  S1 and S2, normal and regular.  No significant murmur, gallop or

rub is heard.

LUNGS:  Shows good bilateral air exchange.  No rales or rhonchi.

ABDOMEN:  Soft and nontender.  No organomegaly.  No fluids.  No sign of

acute abdomen.  No guarding.  No rigidity.  No rebound.  Bowel sounds are

present and normal.

EXTREMITIES:  No edema.  No calf swelling.  No tenderness.  No acute

ischemia.

CENTRAL NERVOUS SYSTEM:  Essentially unchanged and there is no sign of any

acute gross focal motor or sensory neurological deficit.



DIAGNOSTIC DATA:  Available diagnostic data reviewed.  WBC 5.2, hemoglobin

12.2, hematocrit 36.5, and platelets 152.  The pH 7.37, pCO2 of 48, pO2 of

19, of course, this is venous blood gas.  Sodium 135, potassium 4.2,

chloride 99, bicarbonate 22, BUN 8, and creatinine 0.6.  Lipase level is

3683, AST of 119, ALT of 61.  Urinalysis is negative.  CAT scan of abdomen

shows hepatomegaly with _____, questionable pancreatitis, dilated common

bile duct.



ADMITTING IMPRESSION:  Acute pancreatitis, cirrhosis of liver,

hypertension, congestive heart failure, chronic obstructive pulmonary

disease, depression, bipolar disorder, schizophrenia and arthritis.



PLAN:  As ordered.  Case and plan discussed with the patient.





__________________________________________

Gilson Rosas MD



DD:  10/01/2017 7:11:04

DT:  10/01/2017 12:53:28

Job # [id number]

## 2017-12-28 ENCOUNTER — HOSPITAL ENCOUNTER (INPATIENT)
Dept: HOSPITAL 31 - C.ER | Age: 47
LOS: 3 days | Discharge: HOME | DRG: 432 | End: 2017-12-31
Attending: INTERNAL MEDICINE | Admitting: INTERNAL MEDICINE
Payer: MEDICARE

## 2017-12-28 VITALS — BODY MASS INDEX: 44.4 KG/M2

## 2017-12-28 DIAGNOSIS — F31.9: ICD-10-CM

## 2017-12-28 DIAGNOSIS — Z87.891: ICD-10-CM

## 2017-12-28 DIAGNOSIS — D72.819: ICD-10-CM

## 2017-12-28 DIAGNOSIS — J44.9: ICD-10-CM

## 2017-12-28 DIAGNOSIS — E66.9: ICD-10-CM

## 2017-12-28 DIAGNOSIS — Z90.49: ICD-10-CM

## 2017-12-28 DIAGNOSIS — F20.9: ICD-10-CM

## 2017-12-28 DIAGNOSIS — Z91.19: ICD-10-CM

## 2017-12-28 DIAGNOSIS — K70.41: Primary | ICD-10-CM

## 2017-12-28 DIAGNOSIS — E83.42: ICD-10-CM

## 2017-12-28 DIAGNOSIS — M19.90: ICD-10-CM

## 2017-12-28 DIAGNOSIS — E11.51: ICD-10-CM

## 2017-12-28 DIAGNOSIS — E87.1: ICD-10-CM

## 2017-12-28 DIAGNOSIS — I11.0: ICD-10-CM

## 2017-12-28 DIAGNOSIS — F10.20: ICD-10-CM

## 2017-12-28 DIAGNOSIS — I50.9: ICD-10-CM

## 2017-12-28 DIAGNOSIS — E78.5: ICD-10-CM

## 2017-12-28 DIAGNOSIS — K70.31: ICD-10-CM

## 2017-12-28 LAB
ALBUMIN SERPL-MCNC: 2.9 G/DL (ref 3.5–5)
ALBUMIN/GLOB SERPL: 0.6 {RATIO} (ref 1–2.1)
ALT SERPL-CCNC: 38 U/L (ref 21–72)
APAP SERPL-MCNC: < 10 UG/ML (ref 10–30)
APTT BLD: 31 SECONDS (ref 21–34)
AST SERPL-CCNC: 67 U/L (ref 17–59)
BASE EXCESS BLDV CALC-SCNC: -5.8 MMOL/L (ref 0–2)
BASOPHILS # BLD AUTO: 0 K/UL (ref 0–0.2)
BASOPHILS NFR BLD: 0.6 % (ref 0–2)
BILIRUB UR-MCNC: NEGATIVE MG/DL
BUN SERPL-MCNC: 12 MG/DL (ref 9–20)
CALCIUM SERPL-MCNC: 7.5 MG/DL (ref 8.6–10.4)
EOSINOPHIL # BLD AUTO: 0.1 K/UL (ref 0–0.7)
EOSINOPHIL NFR BLD: 2 % (ref 0–4)
ERYTHROCYTE [DISTWIDTH] IN BLOOD BY AUTOMATED COUNT: 20.8 % (ref 11.5–14.5)
GFR NON-AFRICAN AMERICAN: > 60
GLUCOSE UR STRIP-MCNC: NORMAL MG/DL
HGB BLD-MCNC: 11.6 G/DL (ref 12–18)
INR PPP: 1.6
LEUKOCYTE ESTERASE UR-ACNC: (no result) LEU/UL
LIPASE: 111 U/L (ref 23–300)
LYMPHOCYTES # BLD AUTO: 1.1 K/UL (ref 1–4.3)
LYMPHOCYTES NFR BLD AUTO: 34.1 % (ref 20–40)
MAGNESIUM SERPL-MCNC: 1.1 MG/DL (ref 1.6–2.3)
MCH RBC QN AUTO: 29.2 PG (ref 27–31)
MCHC RBC AUTO-ENTMCNC: 33.4 G/DL (ref 33–37)
MCV RBC AUTO: 87.4 FL (ref 80–94)
MONOCYTES # BLD: 0.5 K/UL (ref 0–0.8)
MONOCYTES NFR BLD: 16.3 % (ref 0–10)
NEUTROPHILS # BLD: 1.5 K/UL (ref 1.8–7)
NEUTROPHILS NFR BLD AUTO: 47 % (ref 50–75)
NRBC BLD AUTO-RTO: 0 % (ref 0–2)
PCO2 BLDV: 37 MMHG (ref 40–60)
PH BLDV: 7.33 [PH] (ref 7.32–7.43)
PH UR STRIP: 6 [PH] (ref 5–8)
PLATELET # BLD: 146 K/UL (ref 130–400)
PMV BLD AUTO: 8.1 FL (ref 7.2–11.7)
PROT UR STRIP-MCNC: NEGATIVE MG/DL
PROTHROMBIN TIME: 18.1 SECONDS (ref 9.7–12.2)
RBC # BLD AUTO: 3.96 MIL/UL (ref 4.4–5.9)
RBC # UR STRIP: NEGATIVE /UL
SALICYLATE: < 1 MG/DL 1
SP GR UR STRIP: 1.01 (ref 1–1.03)
SQUAMOUS EPITHIAL: < 1 /HPF (ref 0–5)
URINE NITRATE: NEGATIVE
UROBILINOGEN UR-MCNC: 2 MG/DL (ref 0.2–1)
VENOUS BLOOD GAS PO2: 27 MM/HG (ref 30–55)
WBC # BLD AUTO: 3.2 K/UL (ref 4.8–10.8)

## 2017-12-29 LAB
% IRON SATURATION: 12 (ref 20–55)
ALBUMIN SERPL-MCNC: 2.9 G/DL (ref 3.5–5)
ALBUMIN/GLOB SERPL: 0.6 {RATIO} (ref 1–2.1)
ALT SERPL-CCNC: 39 U/L (ref 21–72)
AST SERPL-CCNC: 63 U/L (ref 17–59)
BASOPHILS # BLD AUTO: 0.1 K/UL (ref 0–0.2)
BASOPHILS NFR BLD: 1.8 % (ref 0–2)
BUN SERPL-MCNC: 10 MG/DL (ref 9–20)
CALCIUM SERPL-MCNC: 8.3 MG/DL (ref 8.6–10.4)
EOSINOPHIL # BLD AUTO: 0.1 K/UL (ref 0–0.7)
EOSINOPHIL NFR BLD: 4 % (ref 0–4)
ERYTHROCYTE [DISTWIDTH] IN BLOOD BY AUTOMATED COUNT: 19.8 % (ref 11.5–14.5)
FOLATE SERPL-MCNC: 14.5 NG/ML
GFR NON-AFRICAN AMERICAN: > 60
HGB BLD-MCNC: 10.5 G/DL (ref 12–18)
IRON SERPL-MCNC: 36 UG/DL (ref 49–181)
LYMPHOCYTES # BLD AUTO: 1.2 K/UL (ref 1–4.3)
LYMPHOCYTES NFR BLD AUTO: 34.4 % (ref 20–40)
MAGNESIUM SERPL-MCNC: 1.5 MG/DL (ref 1.6–2.3)
MCH RBC QN AUTO: 29.4 PG (ref 27–31)
MCHC RBC AUTO-ENTMCNC: 33.9 G/DL (ref 33–37)
MCV RBC AUTO: 86.7 FL (ref 80–94)
MONOCYTES # BLD: 0.4 K/UL (ref 0–0.8)
MONOCYTES NFR BLD: 12.8 % (ref 0–10)
NEUTROPHILS # BLD: 1.6 K/UL (ref 1.8–7)
NEUTROPHILS NFR BLD AUTO: 47 % (ref 50–75)
NRBC BLD AUTO-RTO: 0.1 % (ref 0–2)
PLATELET # BLD: 168 K/UL (ref 130–400)
PMV BLD AUTO: 7.9 FL (ref 7.2–11.7)
RBC # BLD AUTO: 3.58 MIL/UL (ref 4.4–5.9)
TIBC SERPL-MCNC: 292 UG/DL (ref 250–450)
VIT B12 SERPL-MCNC: 955 PG/ML (ref 239–931)
WBC # BLD AUTO: 3.5 K/UL (ref 4.8–10.8)

## 2017-12-29 RX ADMIN — CILOSTAZOL SCH MG: 100 TABLET ORAL at 18:00

## 2017-12-29 RX ADMIN — CILOSTAZOL SCH MG: 100 TABLET ORAL at 10:35

## 2017-12-29 RX ADMIN — MINERAL SUPPLEMENT IRON 300 MG / 5 ML STRENGTH LIQUID 100 PER BOX UNFLAVORED SCH MG: at 11:50

## 2017-12-29 RX ADMIN — MINERAL SUPPLEMENT IRON 300 MG / 5 ML STRENGTH LIQUID 100 PER BOX UNFLAVORED SCH MG: at 17:21

## 2017-12-29 RX ADMIN — Medication SCH TAB: at 11:50

## 2017-12-30 LAB
ALBUMIN SERPL-MCNC: 3 G/DL (ref 3.5–5)
ALBUMIN/GLOB SERPL: 0.6 {RATIO} (ref 1–2.1)
ALT SERPL-CCNC: 48 U/L (ref 21–72)
APTT BLD: 34 SECONDS (ref 21–34)
AST SERPL-CCNC: 67 U/L (ref 17–59)
BASOPHILS # BLD AUTO: 0 K/UL (ref 0–0.2)
BASOPHILS NFR BLD: 0.5 % (ref 0–2)
BUN SERPL-MCNC: 10 MG/DL (ref 9–20)
CALCIUM SERPL-MCNC: 8.3 MG/DL (ref 8.6–10.4)
EOSINOPHIL # BLD AUTO: 0.1 K/UL (ref 0–0.7)
EOSINOPHIL NFR BLD: 1.8 % (ref 0–4)
ERYTHROCYTE [DISTWIDTH] IN BLOOD BY AUTOMATED COUNT: 20.4 % (ref 11.5–14.5)
GFR NON-AFRICAN AMERICAN: > 60
HGB BLD-MCNC: 11.1 G/DL (ref 12–18)
INR PPP: 1.6
LYMPHOCYTES # BLD AUTO: 1.7 K/UL (ref 1–4.3)
LYMPHOCYTES NFR BLD AUTO: 32.4 % (ref 20–40)
MAGNESIUM SERPL-MCNC: 1.2 MG/DL (ref 1.6–2.3)
MCH RBC QN AUTO: 29 PG (ref 27–31)
MCHC RBC AUTO-ENTMCNC: 33.2 G/DL (ref 33–37)
MCV RBC AUTO: 87.2 FL (ref 80–94)
MONOCYTES # BLD: 0.7 K/UL (ref 0–0.8)
MONOCYTES NFR BLD: 14.4 % (ref 0–10)
NEUTROPHILS # BLD: 2.6 K/UL (ref 1.8–7)
NEUTROPHILS NFR BLD AUTO: 50.9 % (ref 50–75)
NRBC BLD AUTO-RTO: 0.1 % (ref 0–2)
PLATELET # BLD: 183 K/UL (ref 130–400)
PMV BLD AUTO: 8.2 FL (ref 7.2–11.7)
PROTHROMBIN TIME: 18.8 SECONDS (ref 9.7–12.2)
RBC # BLD AUTO: 3.85 MIL/UL (ref 4.4–5.9)
WBC # BLD AUTO: 5.2 K/UL (ref 4.8–10.8)

## 2017-12-30 RX ADMIN — MINERAL SUPPLEMENT IRON 300 MG / 5 ML STRENGTH LIQUID 100 PER BOX UNFLAVORED SCH MG: at 17:30

## 2017-12-30 RX ADMIN — Medication SCH TAB: at 09:48

## 2017-12-30 RX ADMIN — CILOSTAZOL SCH MG: 100 TABLET ORAL at 22:26

## 2017-12-30 RX ADMIN — CILOSTAZOL SCH: 100 TABLET ORAL at 13:44

## 2017-12-30 RX ADMIN — MINERAL SUPPLEMENT IRON 300 MG / 5 ML STRENGTH LIQUID 100 PER BOX UNFLAVORED SCH MG: at 09:51

## 2017-12-31 VITALS — RESPIRATION RATE: 20 BRPM

## 2017-12-31 VITALS
DIASTOLIC BLOOD PRESSURE: 71 MMHG | TEMPERATURE: 98.5 F | HEART RATE: 91 BPM | SYSTOLIC BLOOD PRESSURE: 128 MMHG | OXYGEN SATURATION: 99 %

## 2017-12-31 LAB
ALBUMIN SERPL-MCNC: 2.5 G/DL (ref 3.5–5)
ALBUMIN/GLOB SERPL: 0.6 {RATIO} (ref 1–2.1)
ALT SERPL-CCNC: 38 U/L (ref 21–72)
AST SERPL-CCNC: 56 U/L (ref 17–59)
BASOPHILS # BLD AUTO: 0 K/UL (ref 0–0.2)
BASOPHILS NFR BLD: 0.8 % (ref 0–2)
BUN SERPL-MCNC: 11 MG/DL (ref 9–20)
CALCIUM SERPL-MCNC: 7.6 MG/DL (ref 8.6–10.4)
EOSINOPHIL # BLD AUTO: 0.2 K/UL (ref 0–0.7)
EOSINOPHIL NFR BLD: 3.9 % (ref 0–4)
ERYTHROCYTE [DISTWIDTH] IN BLOOD BY AUTOMATED COUNT: 19.9 % (ref 11.5–14.5)
GFR NON-AFRICAN AMERICAN: > 60
HGB BLD-MCNC: 9.8 G/DL (ref 12–18)
LYMPHOCYTES # BLD AUTO: 2 K/UL (ref 1–4.3)
LYMPHOCYTES NFR BLD AUTO: 39 % (ref 20–40)
MAGNESIUM SERPL-MCNC: 1.1 MG/DL (ref 1.6–2.3)
MCH RBC QN AUTO: 29.4 PG (ref 27–31)
MCHC RBC AUTO-ENTMCNC: 33.6 G/DL (ref 33–37)
MCV RBC AUTO: 87.5 FL (ref 80–94)
MONOCYTES # BLD: 0.9 K/UL (ref 0–0.8)
MONOCYTES NFR BLD: 17.5 % (ref 0–10)
NEUTROPHILS # BLD: 1.9 K/UL (ref 1.8–7)
NEUTROPHILS NFR BLD AUTO: 38.8 % (ref 50–75)
NRBC BLD AUTO-RTO: 0.1 % (ref 0–2)
PLATELET # BLD: 145 K/UL (ref 130–400)
PMV BLD AUTO: 8 FL (ref 7.2–11.7)
RBC # BLD AUTO: 3.34 MIL/UL (ref 4.4–5.9)
WBC # BLD AUTO: 5 K/UL (ref 4.8–10.8)

## 2017-12-31 RX ADMIN — MINERAL SUPPLEMENT IRON 300 MG / 5 ML STRENGTH LIQUID 100 PER BOX UNFLAVORED SCH MG: at 09:28

## 2017-12-31 RX ADMIN — Medication SCH TAB: at 09:17

## 2017-12-31 RX ADMIN — CILOSTAZOL SCH MG: 100 TABLET ORAL at 09:18

## 2018-01-08 ENCOUNTER — HOSPITAL ENCOUNTER (INPATIENT)
Dept: HOSPITAL 31 - C.ER | Age: 48
LOS: 3 days | Discharge: HOME | DRG: 433 | End: 2018-01-11
Attending: FAMILY MEDICINE | Admitting: FAMILY MEDICINE
Payer: MEDICARE

## 2018-01-08 VITALS — BODY MASS INDEX: 44.4 KG/M2

## 2018-01-08 VITALS — RESPIRATION RATE: 20 BRPM

## 2018-01-08 DIAGNOSIS — Z87.891: ICD-10-CM

## 2018-01-08 DIAGNOSIS — K70.40: Primary | ICD-10-CM

## 2018-01-08 DIAGNOSIS — F10.288: ICD-10-CM

## 2018-01-08 DIAGNOSIS — Z90.49: ICD-10-CM

## 2018-01-08 DIAGNOSIS — K70.31: ICD-10-CM

## 2018-01-08 DIAGNOSIS — F20.9: ICD-10-CM

## 2018-01-08 DIAGNOSIS — T50.2X5A: ICD-10-CM

## 2018-01-08 DIAGNOSIS — I11.0: ICD-10-CM

## 2018-01-08 DIAGNOSIS — E66.01: ICD-10-CM

## 2018-01-08 DIAGNOSIS — E83.42: ICD-10-CM

## 2018-01-08 DIAGNOSIS — M62.81: ICD-10-CM

## 2018-01-08 DIAGNOSIS — E86.1: ICD-10-CM

## 2018-01-08 DIAGNOSIS — E22.2: ICD-10-CM
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
## 2018-01-08 DIAGNOSIS — I87.8: ICD-10-CM

## 2018-01-08 DIAGNOSIS — E78.5: ICD-10-CM

## 2018-01-08 DIAGNOSIS — I50.9: ICD-10-CM

## 2018-01-08 DIAGNOSIS — J44.9: ICD-10-CM

## 2018-01-08 DIAGNOSIS — D64.9: ICD-10-CM

## 2018-01-08 DIAGNOSIS — F31.9: ICD-10-CM

## 2018-01-08 LAB
ALBUMIN SERPL-MCNC: 3.1 G/DL (ref 3.5–5)
ALBUMIN/GLOB SERPL: 0.6 {RATIO} (ref 1–2.1)
ALT SERPL-CCNC: 49 U/L (ref 21–72)
APTT BLD: 33 SECONDS (ref 21–34)
AST SERPL-CCNC: 85 U/L (ref 17–59)
BASE EXCESS BLDV CALC-SCNC: -2.3 MMOL/L (ref 0–2)
BASOPHILS # BLD AUTO: 0 K/UL (ref 0–0.2)
BASOPHILS NFR BLD: 0.7 % (ref 0–2)
BILIRUB UR-MCNC: NEGATIVE MG/DL
BNP SERPL-MCNC: 38.1 PG/ML (ref 0–450)
BUN SERPL-MCNC: 12 MG/DL (ref 9–20)
BUN SERPL-MCNC: 15 MG/DL (ref 9–20)
CALCIUM SERPL-MCNC: 7.6 MG/DL (ref 8.6–10.4)
CALCIUM SERPL-MCNC: 7.8 MG/DL (ref 8.6–10.4)
EOSINOPHIL # BLD AUTO: 0.2 K/UL (ref 0–0.7)
EOSINOPHIL NFR BLD: 6.1 % (ref 0–4)
ERYTHROCYTE [DISTWIDTH] IN BLOOD BY AUTOMATED COUNT: 20.3 % (ref 11.5–14.5)
GFR NON-AFRICAN AMERICAN: > 60
GFR NON-AFRICAN AMERICAN: > 60
GLUCOSE UR STRIP-MCNC: NORMAL MG/DL
HGB BLD-MCNC: 12.1 G/DL (ref 12–18)
INR PPP: 1.7
LEUKOCYTE ESTERASE UR-ACNC: (no result) LEU/UL
LIPASE: 217 U/L (ref 23–300)
LYMPHOCYTES # BLD AUTO: 1.5 K/UL (ref 1–4.3)
LYMPHOCYTES NFR BLD AUTO: 46.3 % (ref 20–40)
MAGNESIUM SERPL-MCNC: 1.3 MG/DL (ref 1.6–2.3)
MCH RBC QN AUTO: 29.6 PG (ref 27–31)
MCHC RBC AUTO-ENTMCNC: 34.3 G/DL (ref 33–37)
MCV RBC AUTO: 86.3 FL (ref 80–94)
MONOCYTES # BLD: 0.6 K/UL (ref 0–0.8)
MONOCYTES NFR BLD: 19.6 % (ref 0–10)
NEUTROPHILS # BLD: 0.9 K/UL (ref 1.8–7)
NEUTROPHILS NFR BLD AUTO: 27.3 % (ref 50–75)
NRBC BLD AUTO-RTO: 0.2 % (ref 0–2)
OSMOLALITY,URINE: 699 MOSM/KG (ref 300–1000)
PCO2 BLDV: 33 MMHG (ref 40–60)
PH BLDV: 7.42 [PH] (ref 7.32–7.43)
PH UR STRIP: 5 [PH] (ref 5–8)
PLATELET # BLD: 162 K/UL (ref 130–400)
PMV BLD AUTO: 7.7 FL (ref 7.2–11.7)
PROT UR STRIP-MCNC: NEGATIVE MG/DL
PROTHROMBIN TIME: 19 SECONDS (ref 9.7–12.2)
RBC # BLD AUTO: 4.08 MIL/UL (ref 4.4–5.9)
RBC # UR STRIP: NEGATIVE /UL
SP GR UR STRIP: 1.02 (ref 1–1.03)
SQUAMOUS EPITHIAL: 3 /HPF (ref 0–5)
URINE NITRATE: NEGATIVE
UROBILINOGEN UR-MCNC: 2 MG/DL (ref 0.2–1)
VENOUS BLOOD GAS PO2: 60 MM/HG (ref 30–55)
WBC # BLD AUTO: 3.2 K/UL (ref 4.8–10.8)

## 2018-01-08 RX ADMIN — CILOSTAZOL SCH MG: 100 TABLET ORAL at 10:47

## 2018-01-08 RX ADMIN — CILOSTAZOL SCH MG: 100 TABLET ORAL at 18:17

## 2018-01-08 RX ADMIN — Medication SCH TAB: at 11:15

## 2018-01-09 LAB
ALBUMIN SERPL-MCNC: 2.8 G/DL (ref 3.5–5)
ALBUMIN/GLOB SERPL: 0.6 {RATIO} (ref 1–2.1)
ALT SERPL-CCNC: 55 U/L (ref 21–72)
AST SERPL-CCNC: 57 U/L (ref 17–59)
BASOPHILS # BLD AUTO: 0 K/UL (ref 0–0.2)
BASOPHILS NFR BLD: 0.4 % (ref 0–2)
BUN SERPL-MCNC: 11 MG/DL (ref 9–20)
BUN SERPL-MCNC: 12 MG/DL (ref 9–20)
BUN SERPL-MCNC: 14 MG/DL (ref 9–20)
BUN SERPL-MCNC: 9 MG/DL (ref 9–20)
CALCIUM SERPL-MCNC: 6.9 MG/DL (ref 8.6–10.4)
CALCIUM SERPL-MCNC: 7.3 MG/DL (ref 8.6–10.4)
CALCIUM SERPL-MCNC: 7.7 MG/DL (ref 8.6–10.4)
CALCIUM SERPL-MCNC: 7.8 MG/DL (ref 8.6–10.4)
EOSINOPHIL # BLD AUTO: 0.1 K/UL (ref 0–0.7)
EOSINOPHIL NFR BLD: 3.6 % (ref 0–4)
ERYTHROCYTE [DISTWIDTH] IN BLOOD BY AUTOMATED COUNT: 20.3 % (ref 11.5–14.5)
GFR NON-AFRICAN AMERICAN: > 60
HGB BLD-MCNC: 10.9 G/DL (ref 12–18)
LYMPHOCYTES # BLD AUTO: 1.4 K/UL (ref 1–4.3)
LYMPHOCYTES NFR BLD AUTO: 49.1 % (ref 20–40)
MCH RBC QN AUTO: 29.1 PG (ref 27–31)
MCHC RBC AUTO-ENTMCNC: 33.3 G/DL (ref 33–37)
MCV RBC AUTO: 87.6 FL (ref 80–94)
MONOCYTES # BLD: 0.5 K/UL (ref 0–0.8)
MONOCYTES NFR BLD: 17.4 % (ref 0–10)
NEUTROPHILS # BLD: 0.9 K/UL (ref 1.8–7)
NEUTROPHILS NFR BLD AUTO: 29.5 % (ref 50–75)
NRBC BLD AUTO-RTO: 0 % (ref 0–2)
OSMOLALITY,URINE: 157 MOSM/KG (ref 300–1000)
OSMOLALITY,URINE: 597 MOSM/KG (ref 300–1000)
PLATELET # BLD: 137 K/UL (ref 130–400)
PMV BLD AUTO: 7.7 FL (ref 7.2–11.7)
RBC # BLD AUTO: 3.74 MIL/UL (ref 4.4–5.9)
WBC # BLD AUTO: 2.9 K/UL (ref 4.8–10.8)

## 2018-01-09 RX ADMIN — Medication SCH MG: at 14:35

## 2018-01-09 RX ADMIN — CILOSTAZOL SCH MG: 100 TABLET ORAL at 10:52

## 2018-01-09 RX ADMIN — CILOSTAZOL SCH MG: 100 TABLET ORAL at 17:13

## 2018-01-09 RX ADMIN — Medication SCH TAB: at 10:15

## 2018-01-09 RX ADMIN — Medication SCH MG: at 17:14

## 2018-01-10 LAB
ALBUMIN SERPL-MCNC: 2.7 G/DL (ref 3.5–5)
ALBUMIN/GLOB SERPL: 0.6 {RATIO} (ref 1–2.1)
ALT SERPL-CCNC: 54 U/L (ref 21–72)
ANISOCYTOSIS BLD QL SMEAR: SLIGHT
AST SERPL-CCNC: 55 U/L (ref 17–59)
BASOPHILS # BLD AUTO: 0 K/UL (ref 0–0.2)
BASOPHILS NFR BLD: 0.6 % (ref 0–2)
BUN SERPL-MCNC: 7 MG/DL (ref 9–20)
CALCIUM SERPL-MCNC: 7.6 MG/DL (ref 8.6–10.4)
EOSINOPHIL # BLD AUTO: 0.1 K/UL (ref 0–0.7)
EOSINOPHIL NFR BLD: 4.3 % (ref 0–4)
EOSINOPHIL NFR BLD: 5 % (ref 0–4)
ERYTHROCYTE [DISTWIDTH] IN BLOOD BY AUTOMATED COUNT: 19.5 % (ref 11.5–14.5)
GFR NON-AFRICAN AMERICAN: > 60
HGB BLD-MCNC: 10.5 G/DL (ref 12–18)
HYPOCHROMIC: SLIGHT
LYMPHOCYTE: 46 % (ref 20–40)
LYMPHOCYTES # BLD AUTO: 1.4 K/UL (ref 1–4.3)
LYMPHOCYTES NFR BLD AUTO: 48.1 % (ref 20–40)
MCH RBC QN AUTO: 29.3 PG (ref 27–31)
MCHC RBC AUTO-ENTMCNC: 33.5 G/DL (ref 33–37)
MCV RBC AUTO: 87.4 FL (ref 80–94)
MONOCYTE: 14 % (ref 0–10)
MONOCYTES # BLD: 0.6 K/UL (ref 0–0.8)
MONOCYTES NFR BLD: 20.5 % (ref 0–10)
NEUTROPHILS # BLD: 0.8 K/UL (ref 1.8–7)
NEUTROPHILS NFR BLD AUTO: 26.5 % (ref 50–75)
NEUTROPHILS NFR BLD AUTO: 35 % (ref 50–75)
NRBC BLD AUTO-RTO: 0.1 % (ref 0–2)
OSMOLALITY,URINE: 705 MOSM/KG (ref 300–1000)
OVALOCYTES BLD QL SMEAR: SLIGHT
PLATELET # BLD EST: (no result) 10*3/UL
PLATELET # BLD: 129 K/UL (ref 130–400)
PMV BLD AUTO: 7.8 FL (ref 7.2–11.7)
POIKILOCYTOSIS BLD QL SMEAR: SLIGHT
RBC # BLD AUTO: 3.58 MIL/UL (ref 4.4–5.9)
TARGETS BLD QL SMEAR: SLIGHT
TEARDROP CELLS: SLIGHT
TOTAL CELLS COUNTED BLD: 100
WBC # BLD AUTO: 3 K/UL (ref 4.8–10.8)

## 2018-01-10 RX ADMIN — Medication SCH MG: at 17:41

## 2018-01-10 RX ADMIN — Medication SCH MG: at 10:13

## 2018-01-10 RX ADMIN — CILOSTAZOL SCH MG: 100 TABLET ORAL at 10:13

## 2018-01-10 RX ADMIN — Medication SCH TAB: at 10:14

## 2018-01-10 RX ADMIN — CILOSTAZOL SCH MG: 100 TABLET ORAL at 17:41

## 2018-01-11 VITALS
SYSTOLIC BLOOD PRESSURE: 115 MMHG | OXYGEN SATURATION: 98 % | DIASTOLIC BLOOD PRESSURE: 62 MMHG | HEART RATE: 84 BPM | TEMPERATURE: 98.3 F

## 2018-01-11 LAB
ALBUMIN SERPL-MCNC: 2.6 G/DL (ref 3.5–5)
ALBUMIN/GLOB SERPL: 0.6 {RATIO} (ref 1–2.1)
ALT SERPL-CCNC: 55 U/L (ref 21–72)
AST SERPL-CCNC: 61 U/L (ref 17–59)
BASOPHILS # BLD AUTO: 0 K/UL (ref 0–0.2)
BASOPHILS NFR BLD: 0.5 % (ref 0–2)
BUN SERPL-MCNC: 6 MG/DL (ref 9–20)
CALCIUM SERPL-MCNC: 7.6 MG/DL (ref 8.6–10.4)
EOSINOPHIL # BLD AUTO: 0.1 K/UL (ref 0–0.7)
EOSINOPHIL NFR BLD: 2.6 % (ref 0–4)
ERYTHROCYTE [DISTWIDTH] IN BLOOD BY AUTOMATED COUNT: 19.7 % (ref 11.5–14.5)
GFR NON-AFRICAN AMERICAN: > 60
HGB BLD-MCNC: 10.7 G/DL (ref 12–18)
LYMPHOCYTES # BLD AUTO: 1.5 K/UL (ref 1–4.3)
LYMPHOCYTES NFR BLD AUTO: 28.1 % (ref 20–40)
MAGNESIUM SERPL-MCNC: 1.2 MG/DL (ref 1.6–2.3)
MCH RBC QN AUTO: 29.2 PG (ref 27–31)
MCHC RBC AUTO-ENTMCNC: 33.3 G/DL (ref 33–37)
MCV RBC AUTO: 87.8 FL (ref 80–94)
MONOCYTES # BLD: 1 K/UL (ref 0–0.8)
MONOCYTES NFR BLD: 19.9 % (ref 0–10)
NEUTROPHILS # BLD: 2.6 K/UL (ref 1.8–7)
NEUTROPHILS NFR BLD AUTO: 48.9 % (ref 50–75)
NRBC BLD AUTO-RTO: 0.1 % (ref 0–2)
OSMOLALITY,URINE: 253 MOSM/KG (ref 300–1000)
PLATELET # BLD: 133 K/UL (ref 130–400)
PMV BLD AUTO: 7.9 FL (ref 7.2–11.7)
RBC # BLD AUTO: 3.65 MIL/UL (ref 4.4–5.9)
WBC # BLD AUTO: 5.2 K/UL (ref 4.8–10.8)

## 2018-01-11 RX ADMIN — Medication SCH TAB: at 10:14

## 2018-01-11 RX ADMIN — MAGNESIUM SULFATE IN DEXTROSE SCH GM: 10 INJECTION, SOLUTION INTRAVENOUS at 13:00

## 2018-01-11 RX ADMIN — CILOSTAZOL SCH MG: 100 TABLET ORAL at 17:39

## 2018-01-11 RX ADMIN — Medication SCH MG: at 17:39

## 2018-01-11 RX ADMIN — CILOSTAZOL SCH MG: 100 TABLET ORAL at 10:15

## 2018-01-11 RX ADMIN — MAGNESIUM SULFATE IN DEXTROSE SCH GM: 10 INJECTION, SOLUTION INTRAVENOUS at 11:00

## 2018-01-11 RX ADMIN — Medication SCH MG: at 10:15

## 2018-01-11 RX ADMIN — MAGNESIUM SULFATE IN DEXTROSE SCH GM: 10 INJECTION, SOLUTION INTRAVENOUS at 12:00

## 2018-01-11 RX ADMIN — MAGNESIUM SULFATE IN DEXTROSE SCH GM: 10 INJECTION, SOLUTION INTRAVENOUS at 10:00

## 2018-01-11 RX ADMIN — Medication SCH MG: at 10:16

## 2018-05-06 ENCOUNTER — HOSPITAL ENCOUNTER (OUTPATIENT)
Dept: HOSPITAL 31 - C.ER | Age: 48
Setting detail: OBSERVATION
LOS: 3 days | Discharge: LEFT BEFORE DISCHARGE | End: 2018-05-09
Attending: INTERNAL MEDICINE | Admitting: INTERNAL MEDICINE
Payer: MEDICARE

## 2018-05-06 VITALS — BODY MASS INDEX: 44.4 KG/M2

## 2018-05-06 DIAGNOSIS — D69.59: ICD-10-CM

## 2018-05-06 DIAGNOSIS — K70.30: ICD-10-CM

## 2018-05-06 DIAGNOSIS — F10.10: ICD-10-CM

## 2018-05-06 DIAGNOSIS — Z87.891: ICD-10-CM

## 2018-05-06 DIAGNOSIS — I73.9: ICD-10-CM

## 2018-05-06 DIAGNOSIS — I11.0: ICD-10-CM

## 2018-05-06 DIAGNOSIS — Z90.49: ICD-10-CM

## 2018-05-06 DIAGNOSIS — D64.9: ICD-10-CM

## 2018-05-06 DIAGNOSIS — F20.9: ICD-10-CM

## 2018-05-06 DIAGNOSIS — M19.90: ICD-10-CM

## 2018-05-06 DIAGNOSIS — I48.92: Primary | ICD-10-CM

## 2018-05-06 DIAGNOSIS — K72.90: ICD-10-CM

## 2018-05-06 DIAGNOSIS — J44.9: ICD-10-CM

## 2018-05-06 DIAGNOSIS — R06.00: ICD-10-CM

## 2018-05-06 DIAGNOSIS — I25.10: ICD-10-CM

## 2018-05-06 DIAGNOSIS — E87.6: ICD-10-CM

## 2018-05-06 DIAGNOSIS — F10.20: ICD-10-CM

## 2018-05-06 DIAGNOSIS — I50.32: ICD-10-CM

## 2018-05-06 LAB
ALBUMIN SERPL-MCNC: 2.6 G/DL (ref 3.5–5)
ALBUMIN/GLOB SERPL: 0.6 {RATIO} (ref 1–2.1)
ALT SERPL-CCNC: 35 U/L (ref 21–72)
APTT BLD: 35 SECONDS (ref 21–34)
AST SERPL-CCNC: 86 U/L (ref 17–59)
BASOPHILS # BLD AUTO: 0 K/UL (ref 0–0.2)
BASOPHILS NFR BLD: 0.8 % (ref 0–2)
BILIRUB UR-MCNC: NEGATIVE MG/DL
BNP SERPL-MCNC: 310 PG/ML (ref 0–450)
BUN SERPL-MCNC: 4 MG/DL (ref 9–20)
CALCIUM SERPL-MCNC: 7.4 MG/DL (ref 8.6–10.4)
EOSINOPHIL # BLD AUTO: 0.2 K/UL (ref 0–0.7)
EOSINOPHIL NFR BLD: 7.3 % (ref 0–4)
ERYTHROCYTE [DISTWIDTH] IN BLOOD BY AUTOMATED COUNT: 18.2 % (ref 11.5–14.5)
GFR NON-AFRICAN AMERICAN: > 60
GLUCOSE UR STRIP-MCNC: NORMAL MG/DL
HGB BLD-MCNC: 11.5 G/DL (ref 12–18)
INR PPP: 1.4
LEUKOCYTE ESTERASE UR-ACNC: (no result) LEU/UL
LYMPHOCYTES # BLD AUTO: 1.3 K/UL (ref 1–4.3)
LYMPHOCYTES NFR BLD AUTO: 45.1 % (ref 20–40)
MCH RBC QN AUTO: 31.4 PG (ref 27–31)
MCHC RBC AUTO-ENTMCNC: 34.4 G/DL (ref 33–37)
MCV RBC AUTO: 91.3 FL (ref 80–94)
MONOCYTES # BLD: 0.4 K/UL (ref 0–0.8)
MONOCYTES NFR BLD: 13.5 % (ref 0–10)
NEUTROPHILS # BLD: 0.9 K/UL (ref 1.8–7)
NEUTROPHILS NFR BLD AUTO: 33.3 % (ref 50–75)
NRBC BLD AUTO-RTO: 0.1 % (ref 0–2)
PH UR STRIP: 7 [PH] (ref 5–8)
PLATELET # BLD: 87 K/UL (ref 130–400)
PMV BLD AUTO: 8.6 FL (ref 7.2–11.7)
PROT UR STRIP-MCNC: NEGATIVE MG/DL
PROTHROMBIN TIME: 14.8 SECONDS (ref 9.7–12.2)
RBC # BLD AUTO: 3.66 MIL/UL (ref 4.4–5.9)
RBC # UR STRIP: NEGATIVE /UL
SP GR UR STRIP: 1 (ref 1–1.03)
SQUAMOUS EPITHIAL: < 1 /HPF (ref 0–5)
UROBILINOGEN UR-MCNC: NORMAL MG/DL (ref 0.2–1)
WBC # BLD AUTO: 2.8 K/UL (ref 4.8–10.8)

## 2018-05-06 PROCEDURE — 82550 ASSAY OF CK (CPK): CPT

## 2018-05-06 PROCEDURE — 71045 X-RAY EXAM CHEST 1 VIEW: CPT

## 2018-05-06 PROCEDURE — 81001 URINALYSIS AUTO W/SCOPE: CPT

## 2018-05-06 PROCEDURE — 85025 COMPLETE CBC W/AUTO DIFF WBC: CPT

## 2018-05-06 PROCEDURE — 96372 THER/PROPH/DIAG INJ SC/IM: CPT

## 2018-05-06 PROCEDURE — 83735 ASSAY OF MAGNESIUM: CPT

## 2018-05-06 PROCEDURE — 93306 TTE W/DOPPLER COMPLETE: CPT

## 2018-05-06 PROCEDURE — 84484 ASSAY OF TROPONIN QUANT: CPT

## 2018-05-06 PROCEDURE — 99285 EMERGENCY DEPT VISIT HI MDM: CPT

## 2018-05-06 PROCEDURE — 85730 THROMBOPLASTIN TIME PARTIAL: CPT

## 2018-05-06 PROCEDURE — 84443 ASSAY THYROID STIM HORMONE: CPT

## 2018-05-06 PROCEDURE — 83880 ASSAY OF NATRIURETIC PEPTIDE: CPT

## 2018-05-06 PROCEDURE — 84100 ASSAY OF PHOSPHORUS: CPT

## 2018-05-06 PROCEDURE — 85610 PROTHROMBIN TIME: CPT

## 2018-05-06 PROCEDURE — 82009 KETONE BODYS QUAL: CPT

## 2018-05-06 PROCEDURE — 82140 ASSAY OF AMMONIA: CPT

## 2018-05-06 PROCEDURE — 96374 THER/PROPH/DIAG INJ IV PUSH: CPT

## 2018-05-06 PROCEDURE — 36415 COLL VENOUS BLD VENIPUNCTURE: CPT

## 2018-05-06 PROCEDURE — 93970 EXTREMITY STUDY: CPT

## 2018-05-06 PROCEDURE — 93005 ELECTROCARDIOGRAM TRACING: CPT

## 2018-05-06 PROCEDURE — 80074 ACUTE HEPATITIS PANEL: CPT

## 2018-05-06 PROCEDURE — 80053 COMPREHEN METABOLIC PANEL: CPT

## 2018-05-06 RX ADMIN — Medication SCH MG: at 17:34

## 2018-05-06 RX ADMIN — CILOSTAZOL SCH MG: 100 TABLET ORAL at 17:34

## 2018-05-07 LAB
ALBUMIN SERPL-MCNC: 2.7 G/DL (ref 3.5–5)
ALBUMIN/GLOB SERPL: 0.6 {RATIO} (ref 1–2.1)
ALT SERPL-CCNC: 36 U/L (ref 21–72)
AST SERPL-CCNC: 78 U/L (ref 17–59)
BASOPHILS # BLD AUTO: 0 K/UL (ref 0–0.2)
BASOPHILS NFR BLD: 0.3 % (ref 0–2)
BUN SERPL-MCNC: 6 MG/DL (ref 9–20)
CALCIUM SERPL-MCNC: 7.3 MG/DL (ref 8.6–10.4)
EOSINOPHIL # BLD AUTO: 0.1 K/UL (ref 0–0.7)
EOSINOPHIL NFR BLD: 3.7 % (ref 0–4)
ERYTHROCYTE [DISTWIDTH] IN BLOOD BY AUTOMATED COUNT: 18.5 % (ref 11.5–14.5)
GFR NON-AFRICAN AMERICAN: > 60
HGB BLD-MCNC: 11 G/DL (ref 12–18)
LYMPHOCYTES # BLD AUTO: 0.8 K/UL (ref 1–4.3)
LYMPHOCYTES NFR BLD AUTO: 32.7 % (ref 20–40)
MCH RBC QN AUTO: 31.6 PG (ref 27–31)
MCHC RBC AUTO-ENTMCNC: 34.8 G/DL (ref 33–37)
MCV RBC AUTO: 90.9 FL (ref 80–94)
MONOCYTES # BLD: 0.3 K/UL (ref 0–0.8)
MONOCYTES NFR BLD: 14.1 % (ref 0–10)
NEUTROPHILS # BLD: 1.2 K/UL (ref 1.8–7)
NEUTROPHILS NFR BLD AUTO: 49.2 % (ref 50–75)
NRBC BLD AUTO-RTO: 0.6 % (ref 0–2)
PLATELET # BLD: 79 K/UL (ref 130–400)
PMV BLD AUTO: 8.6 FL (ref 7.2–11.7)
RBC # BLD AUTO: 3.47 MIL/UL (ref 4.4–5.9)
WBC # BLD AUTO: 2.5 K/UL (ref 4.8–10.8)

## 2018-05-07 RX ADMIN — PANTOPRAZOLE SODIUM SCH MG: 40 TABLET, DELAYED RELEASE ORAL at 11:02

## 2018-05-07 RX ADMIN — Medication SCH MG: at 11:03

## 2018-05-07 RX ADMIN — Medication SCH TAB: at 11:02

## 2018-05-07 RX ADMIN — CILOSTAZOL SCH MG: 100 TABLET ORAL at 11:03

## 2018-05-07 RX ADMIN — ENOXAPARIN SODIUM SCH MG: 40 INJECTION SUBCUTANEOUS at 11:04

## 2018-05-07 RX ADMIN — CILOSTAZOL SCH MG: 100 TABLET ORAL at 17:20

## 2018-05-07 RX ADMIN — Medication SCH MG: at 17:20

## 2018-05-07 RX ADMIN — FLUTICASONE PROPIONATE SCH: 50 SPRAY, METERED NASAL at 11:12

## 2018-05-08 VITALS — RESPIRATION RATE: 20 BRPM

## 2018-05-08 LAB
ALBUMIN SERPL-MCNC: 2.5 G/DL (ref 3.5–5)
ALBUMIN/GLOB SERPL: 0.6 {RATIO} (ref 1–2.1)
ALT SERPL-CCNC: 22 U/L (ref 21–72)
AST SERPL-CCNC: 69 U/L (ref 17–59)
BASOPHILS # BLD AUTO: 0 K/UL (ref 0–0.2)
BASOPHILS NFR BLD: 0.2 % (ref 0–2)
BUN SERPL-MCNC: 6 MG/DL (ref 9–20)
CALCIUM SERPL-MCNC: 7.5 MG/DL (ref 8.6–10.4)
EOSINOPHIL # BLD AUTO: 0.1 K/UL (ref 0–0.7)
EOSINOPHIL NFR BLD: 6.3 % (ref 0–4)
ERYTHROCYTE [DISTWIDTH] IN BLOOD BY AUTOMATED COUNT: 18 % (ref 11.5–14.5)
GFR NON-AFRICAN AMERICAN: > 60
HGB BLD-MCNC: 11.2 G/DL (ref 12–18)
LYMPHOCYTES # BLD AUTO: 1 K/UL (ref 1–4.3)
LYMPHOCYTES NFR BLD AUTO: 47.5 % (ref 20–40)
MCH RBC QN AUTO: 31 PG (ref 27–31)
MCHC RBC AUTO-ENTMCNC: 33.9 G/DL (ref 33–37)
MCV RBC AUTO: 91.3 FL (ref 80–94)
MONOCYTES # BLD: 0.3 K/UL (ref 0–0.8)
MONOCYTES NFR BLD: 14.6 % (ref 0–10)
NEUTROPHILS # BLD: 0.7 K/UL (ref 1.8–7)
NEUTROPHILS NFR BLD AUTO: 31.4 % (ref 50–75)
NRBC BLD AUTO-RTO: 0.3 % (ref 0–2)
PLATELET # BLD: 73 K/UL (ref 130–400)
PMV BLD AUTO: 8.3 FL (ref 7.2–11.7)
RBC # BLD AUTO: 3.63 MIL/UL (ref 4.4–5.9)
WBC # BLD AUTO: 2.2 K/UL (ref 4.8–10.8)

## 2018-05-08 RX ADMIN — CILOSTAZOL SCH MG: 100 TABLET ORAL at 11:00

## 2018-05-08 RX ADMIN — Medication SCH TAB: at 11:00

## 2018-05-08 RX ADMIN — ENOXAPARIN SODIUM SCH MG: 40 INJECTION SUBCUTANEOUS at 11:00

## 2018-05-08 RX ADMIN — FLUTICASONE PROPIONATE SCH SPRAY: 50 SPRAY, METERED NASAL at 13:21

## 2018-05-08 RX ADMIN — Medication SCH MG: at 11:01

## 2018-05-08 RX ADMIN — MAGNESIUM SULFATE IN DEXTROSE SCH MLS/HR: 10 INJECTION, SOLUTION INTRAVENOUS at 14:46

## 2018-05-08 RX ADMIN — PANTOPRAZOLE SODIUM SCH MG: 40 TABLET, DELAYED RELEASE ORAL at 10:59

## 2018-05-08 RX ADMIN — CILOSTAZOL SCH MG: 100 TABLET ORAL at 18:04

## 2018-05-08 RX ADMIN — Medication SCH MG: at 18:04

## 2018-05-08 RX ADMIN — MAGNESIUM SULFATE IN DEXTROSE SCH MLS/HR: 10 INJECTION, SOLUTION INTRAVENOUS at 14:20

## 2018-05-09 VITALS — OXYGEN SATURATION: 99 % | HEART RATE: 76 BPM | TEMPERATURE: 97.6 F

## 2018-05-09 VITALS — SYSTOLIC BLOOD PRESSURE: 119 MMHG | DIASTOLIC BLOOD PRESSURE: 76 MMHG

## 2018-05-09 LAB
ALBUMIN SERPL-MCNC: 2.6 G/DL (ref 3.5–5)
ALBUMIN/GLOB SERPL: 0.6 {RATIO} (ref 1–2.1)
ALT SERPL-CCNC: 23 U/L (ref 21–72)
AST SERPL-CCNC: 61 U/L (ref 17–59)
BASOPHILS # BLD AUTO: 0 K/UL (ref 0–0.2)
BASOPHILS NFR BLD: 0.4 % (ref 0–2)
BUN SERPL-MCNC: 6 MG/DL (ref 9–20)
CALCIUM SERPL-MCNC: 7.6 MG/DL (ref 8.6–10.4)
EOSINOPHIL # BLD AUTO: 0.2 K/UL (ref 0–0.7)
EOSINOPHIL NFR BLD: 6.1 % (ref 0–4)
ERYTHROCYTE [DISTWIDTH] IN BLOOD BY AUTOMATED COUNT: 17.9 % (ref 11.5–14.5)
GFR NON-AFRICAN AMERICAN: > 60
HEPATITIS A IGM: NEGATIVE
HEPATITIS B CORE AB: NEGATIVE
HEPATITIS C ANTIBODY: NEGATIVE
HGB BLD-MCNC: 11.5 G/DL (ref 12–18)
LYMPHOCYTES # BLD AUTO: 1.1 K/UL (ref 1–4.3)
LYMPHOCYTES NFR BLD AUTO: 38.6 % (ref 20–40)
MCH RBC QN AUTO: 31.5 PG (ref 27–31)
MCHC RBC AUTO-ENTMCNC: 34.2 G/DL (ref 33–37)
MCV RBC AUTO: 91.9 FL (ref 80–94)
MONOCYTES # BLD: 0.4 K/UL (ref 0–0.8)
MONOCYTES NFR BLD: 13.2 % (ref 0–10)
NEUTROPHILS # BLD: 1.2 K/UL (ref 1.8–7)
NEUTROPHILS NFR BLD AUTO: 41.7 % (ref 50–75)
NRBC BLD AUTO-RTO: 0.1 % (ref 0–2)
PLATELET # BLD: 90 K/UL (ref 130–400)
PMV BLD AUTO: 8.7 FL (ref 7.2–11.7)
RBC # BLD AUTO: 3.65 MIL/UL (ref 4.4–5.9)
WBC # BLD AUTO: 2.8 K/UL (ref 4.8–10.8)

## 2018-05-09 RX ADMIN — MAGNESIUM SULFATE IN DEXTROSE SCH MLS/HR: 10 INJECTION, SOLUTION INTRAVENOUS at 11:50

## 2018-05-09 RX ADMIN — Medication SCH MG: at 10:24

## 2018-05-09 RX ADMIN — PANTOPRAZOLE SODIUM SCH MG: 40 TABLET, DELAYED RELEASE ORAL at 10:24

## 2018-05-09 RX ADMIN — ENOXAPARIN SODIUM SCH MG: 40 INJECTION SUBCUTANEOUS at 10:23

## 2018-05-09 RX ADMIN — FLUTICASONE PROPIONATE SCH SPRAY: 50 SPRAY, METERED NASAL at 10:25

## 2018-05-09 RX ADMIN — Medication SCH TAB: at 10:23

## 2018-05-09 RX ADMIN — CILOSTAZOL SCH MG: 100 TABLET ORAL at 10:26

## 2018-05-09 RX ADMIN — MAGNESIUM SULFATE IN DEXTROSE SCH MLS/HR: 10 INJECTION, SOLUTION INTRAVENOUS at 10:25

## 2018-06-26 ENCOUNTER — HOSPITAL ENCOUNTER (INPATIENT)
Dept: HOSPITAL 31 - C.ER | Age: 48
LOS: 2 days | Discharge: LEFT BEFORE DISCHARGE | DRG: 433 | End: 2018-06-28
Attending: INTERNAL MEDICINE | Admitting: INTERNAL MEDICINE
Payer: MEDICARE

## 2018-06-26 VITALS — BODY MASS INDEX: 47.9 KG/M2

## 2018-06-26 VITALS — RESPIRATION RATE: 20 BRPM

## 2018-06-26 DIAGNOSIS — E83.42: ICD-10-CM

## 2018-06-26 DIAGNOSIS — J44.9: ICD-10-CM

## 2018-06-26 DIAGNOSIS — E87.6: ICD-10-CM

## 2018-06-26 DIAGNOSIS — I27.20: ICD-10-CM

## 2018-06-26 DIAGNOSIS — F10.20: ICD-10-CM

## 2018-06-26 DIAGNOSIS — F31.9: ICD-10-CM

## 2018-06-26 DIAGNOSIS — D61.818: ICD-10-CM

## 2018-06-26 DIAGNOSIS — I48.92: ICD-10-CM

## 2018-06-26 DIAGNOSIS — K70.30: ICD-10-CM

## 2018-06-26 DIAGNOSIS — I11.0: ICD-10-CM

## 2018-06-26 DIAGNOSIS — F20.9: ICD-10-CM
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
## 2018-06-26 DIAGNOSIS — K70.40: Primary | ICD-10-CM

## 2018-06-26 DIAGNOSIS — I50.32: ICD-10-CM

## 2018-06-26 DIAGNOSIS — F03.90: ICD-10-CM

## 2018-06-26 LAB
ALBUMIN SERPL-MCNC: 3.4 G/DL (ref 3.5–5)
ALBUMIN/GLOB SERPL: 0.6 {RATIO} (ref 1–2.1)
ALT SERPL-CCNC: 20 U/L (ref 21–72)
APTT BLD: 36 SECONDS (ref 21–34)
AST SERPL-CCNC: 91 U/L (ref 17–59)
BASOPHILS # BLD AUTO: 0 K/UL (ref 0–0.2)
BASOPHILS NFR BLD: 1.1 % (ref 0–2)
BNP SERPL-MCNC: 144 PG/ML (ref 0–450)
BUN SERPL-MCNC: 8 MG/DL (ref 9–20)
CALCIUM SERPL-MCNC: 8.7 MG/DL (ref 8.6–10.4)
CK MB SERPL-MCNC: 0.4 NG/ML (ref 0–3.38)
EOSINOPHIL # BLD AUTO: 0.1 K/UL (ref 0–0.7)
EOSINOPHIL NFR BLD: 2.4 % (ref 0–4)
ERYTHROCYTE [DISTWIDTH] IN BLOOD BY AUTOMATED COUNT: 19.8 % (ref 11.5–14.5)
GFR NON-AFRICAN AMERICAN: > 60
HGB BLD-MCNC: 12.2 G/DL (ref 12–18)
INR PPP: 1.7
LYMPHOCYTES # BLD AUTO: 1.1 K/UL (ref 1–4.3)
LYMPHOCYTES NFR BLD AUTO: 36.9 % (ref 20–40)
MCH RBC QN AUTO: 29.9 PG (ref 27–31)
MCHC RBC AUTO-ENTMCNC: 33.2 G/DL (ref 33–37)
MCV RBC AUTO: 89.9 FL (ref 80–94)
MONOCYTES # BLD: 0.5 K/UL (ref 0–0.8)
MONOCYTES NFR BLD: 18.1 % (ref 0–10)
NEUTROPHILS # BLD: 1.3 K/UL (ref 1.8–7)
NEUTROPHILS NFR BLD AUTO: 41.5 % (ref 50–75)
NRBC BLD AUTO-RTO: 0.1 % (ref 0–2)
PLATELET # BLD: 134 K/UL (ref 130–400)
PMV BLD AUTO: 7.5 FL (ref 7.2–11.7)
PROTHROMBIN TIME: 18.1 SECONDS (ref 9.7–12.2)
RBC # BLD AUTO: 4.1 MIL/UL (ref 4.4–5.9)
TROPONIN I SERPL-MCNC: 0.02 NG/ML (ref 0–0.12)
WBC # BLD AUTO: 3 K/UL (ref 4.8–10.8)

## 2018-06-26 RX ADMIN — CILOSTAZOL SCH MG: 100 TABLET ORAL at 22:40

## 2018-06-27 LAB
ALBUMIN SERPL-MCNC: 2.5 G/DL (ref 3.5–5)
ALBUMIN/GLOB SERPL: 0.6 {RATIO} (ref 1–2.1)
ALT SERPL-CCNC: 31 U/L (ref 21–72)
AST SERPL-CCNC: 56 U/L (ref 17–59)
BASOPHILS # BLD AUTO: 0 K/UL (ref 0–0.2)
BASOPHILS NFR BLD: 0.9 % (ref 0–2)
BUN SERPL-MCNC: 10 MG/DL (ref 9–20)
BUN SERPL-MCNC: 9 MG/DL (ref 9–20)
CALCIUM SERPL-MCNC: 8.1 MG/DL (ref 8.6–10.4)
CALCIUM SERPL-MCNC: 8.3 MG/DL (ref 8.6–10.4)
CK MB SERPL-MCNC: 0.33 NG/ML (ref 0–3.38)
EOSINOPHIL # BLD AUTO: 0.1 K/UL (ref 0–0.7)
EOSINOPHIL NFR BLD: 2.3 % (ref 0–4)
ERYTHROCYTE [DISTWIDTH] IN BLOOD BY AUTOMATED COUNT: 19.9 % (ref 11.5–14.5)
GFR NON-AFRICAN AMERICAN: > 60
GFR NON-AFRICAN AMERICAN: > 60
HGB BLD-MCNC: 11.1 G/DL (ref 12–18)
LYMPHOCYTES # BLD AUTO: 0.9 K/UL (ref 1–4.3)
LYMPHOCYTES NFR BLD AUTO: 38.5 % (ref 20–40)
MCH RBC QN AUTO: 30.5 PG (ref 27–31)
MCHC RBC AUTO-ENTMCNC: 33.8 G/DL (ref 33–37)
MCV RBC AUTO: 90.2 FL (ref 80–94)
MONOCYTES # BLD: 0.4 K/UL (ref 0–0.8)
MONOCYTES NFR BLD: 15.7 % (ref 0–10)
NEUTROPHILS # BLD: 1 K/UL (ref 1.8–7)
NEUTROPHILS NFR BLD AUTO: 42.6 % (ref 50–75)
NRBC BLD AUTO-RTO: 0.2 % (ref 0–2)
PLATELET # BLD: 123 K/UL (ref 130–400)
PMV BLD AUTO: 8.4 FL (ref 7.2–11.7)
RBC # BLD AUTO: 3.65 MIL/UL (ref 4.4–5.9)
WBC # BLD AUTO: 2.4 K/UL (ref 4.8–10.8)

## 2018-06-27 RX ADMIN — CILOSTAZOL SCH MG: 100 TABLET ORAL at 10:50

## 2018-06-27 RX ADMIN — PANTOPRAZOLE SODIUM SCH MG: 40 TABLET, DELAYED RELEASE ORAL at 10:51

## 2018-06-27 RX ADMIN — MAGNESIUM SULFATE IN DEXTROSE SCH MLS/HR: 10 INJECTION, SOLUTION INTRAVENOUS at 10:52

## 2018-06-27 RX ADMIN — MAGNESIUM SULFATE IN DEXTROSE SCH MLS/HR: 10 INJECTION, SOLUTION INTRAVENOUS at 11:52

## 2018-06-27 RX ADMIN — ENOXAPARIN SODIUM SCH MG: 40 INJECTION SUBCUTANEOUS at 10:51

## 2018-06-27 RX ADMIN — CILOSTAZOL SCH MG: 100 TABLET ORAL at 18:19

## 2018-06-28 VITALS
DIASTOLIC BLOOD PRESSURE: 68 MMHG | SYSTOLIC BLOOD PRESSURE: 109 MMHG | OXYGEN SATURATION: 99 % | HEART RATE: 74 BPM | TEMPERATURE: 97.9 F

## 2018-06-28 LAB
ALBUMIN SERPL-MCNC: 2.6 G/DL (ref 3.5–5)
ALBUMIN/GLOB SERPL: 0.6 {RATIO} (ref 1–2.1)
ALT SERPL-CCNC: 32 U/L (ref 21–72)
AST SERPL-CCNC: 57 U/L (ref 17–59)
BASOPHILS # BLD AUTO: 0 K/UL (ref 0–0.2)
BASOPHILS NFR BLD: 0.9 % (ref 0–2)
BUN SERPL-MCNC: 9 MG/DL (ref 9–20)
CALCIUM SERPL-MCNC: 8.1 MG/DL (ref 8.6–10.4)
EOSINOPHIL # BLD AUTO: 0 K/UL (ref 0–0.7)
EOSINOPHIL NFR BLD: 2 % (ref 0–4)
ERYTHROCYTE [DISTWIDTH] IN BLOOD BY AUTOMATED COUNT: 20 % (ref 11.5–14.5)
GFR NON-AFRICAN AMERICAN: > 60
HGB BLD-MCNC: 10.8 G/DL (ref 12–18)
LYMPHOCYTES # BLD AUTO: 0.9 K/UL (ref 1–4.3)
LYMPHOCYTES NFR BLD AUTO: 37.7 % (ref 20–40)
MCH RBC QN AUTO: 30.1 PG (ref 27–31)
MCHC RBC AUTO-ENTMCNC: 33.3 G/DL (ref 33–37)
MCV RBC AUTO: 90.5 FL (ref 80–94)
MONOCYTES # BLD: 0.4 K/UL (ref 0–0.8)
MONOCYTES NFR BLD: 16.4 % (ref 0–10)
NEUTROPHILS # BLD: 1.1 K/UL (ref 1.8–7)
NEUTROPHILS NFR BLD AUTO: 43 % (ref 50–75)
NRBC BLD AUTO-RTO: 0.2 % (ref 0–2)
PLATELET # BLD: 126 K/UL (ref 130–400)
PMV BLD AUTO: 8.1 FL (ref 7.2–11.7)
RBC # BLD AUTO: 3.6 MIL/UL (ref 4.4–5.9)
WBC # BLD AUTO: 2.4 K/UL (ref 4.8–10.8)

## 2018-06-28 RX ADMIN — CILOSTAZOL SCH MG: 100 TABLET ORAL at 09:54

## 2018-06-28 RX ADMIN — PANTOPRAZOLE SODIUM SCH MG: 40 TABLET, DELAYED RELEASE ORAL at 09:55

## 2018-06-28 RX ADMIN — MAGNESIUM SULFATE IN DEXTROSE SCH MLS/HR: 10 INJECTION, SOLUTION INTRAVENOUS at 09:53

## 2018-06-28 RX ADMIN — ENOXAPARIN SODIUM SCH MG: 40 INJECTION SUBCUTANEOUS at 09:56

## 2018-06-28 RX ADMIN — MAGNESIUM SULFATE IN DEXTROSE SCH MLS/HR: 10 INJECTION, SOLUTION INTRAVENOUS at 08:50

## 2018-07-12 ENCOUNTER — HOSPITAL ENCOUNTER (INPATIENT)
Dept: HOSPITAL 31 - C.ER | Age: 48
LOS: 1 days | Discharge: LEFT BEFORE DISCHARGE | DRG: 433 | End: 2018-07-13
Attending: INTERNAL MEDICINE | Admitting: INTERNAL MEDICINE
Payer: MEDICARE

## 2018-07-12 VITALS — RESPIRATION RATE: 20 BRPM

## 2018-07-12 VITALS — BODY MASS INDEX: 49.9 KG/M2

## 2018-07-12 DIAGNOSIS — K70.40: Primary | ICD-10-CM

## 2018-07-12 DIAGNOSIS — R45.851: ICD-10-CM

## 2018-07-12 DIAGNOSIS — F03.90: ICD-10-CM

## 2018-07-12 DIAGNOSIS — F10.239: ICD-10-CM

## 2018-07-12 DIAGNOSIS — I48.92: ICD-10-CM

## 2018-07-12 DIAGNOSIS — I50.32: ICD-10-CM

## 2018-07-12 DIAGNOSIS — E72.20: ICD-10-CM

## 2018-07-12 DIAGNOSIS — D61.818: ICD-10-CM

## 2018-07-12 DIAGNOSIS — K70.30: ICD-10-CM

## 2018-07-12 DIAGNOSIS — F32.1: ICD-10-CM

## 2018-07-12 DIAGNOSIS — I73.9: ICD-10-CM

## 2018-07-12 DIAGNOSIS — D64.9: ICD-10-CM

## 2018-07-12 LAB
ALBUMIN SERPL-MCNC: 2.8 G/DL (ref 3.5–5)
ALBUMIN/GLOB SERPL: 0.6 {RATIO} (ref 1–2.1)
ALT SERPL-CCNC: 33 U/L (ref 21–72)
APAP SERPL-MCNC: < 10 UG/ML (ref 10–30)
AST SERPL-CCNC: 65 U/L (ref 17–59)
BASOPHILS # BLD AUTO: 0 K/UL (ref 0–0.2)
BASOPHILS NFR BLD: 0.7 % (ref 0–2)
BILIRUB UR-MCNC: NEGATIVE MG/DL
BUN SERPL-MCNC: 6 MG/DL (ref 9–20)
CALCIUM SERPL-MCNC: 7.8 MG/DL (ref 8.6–10.4)
EOSINOPHIL # BLD AUTO: 0.2 K/UL (ref 0–0.7)
EOSINOPHIL NFR BLD: 8.5 % (ref 0–4)
ERYTHROCYTE [DISTWIDTH] IN BLOOD BY AUTOMATED COUNT: 21 % (ref 11.5–14.5)
GFR NON-AFRICAN AMERICAN: > 60
GLUCOSE UR STRIP-MCNC: NORMAL MG/DL
HGB BLD-MCNC: 10.5 G/DL (ref 12–18)
LEUKOCYTE ESTERASE UR-ACNC: (no result) LEU/UL
LYMPHOCYTES # BLD AUTO: 1.1 K/UL (ref 1–4.3)
LYMPHOCYTES NFR BLD AUTO: 46.2 % (ref 20–40)
MCH RBC QN AUTO: 30.7 PG (ref 27–31)
MCHC RBC AUTO-ENTMCNC: 34.5 G/DL (ref 33–37)
MCV RBC AUTO: 89 FL (ref 80–94)
MONOCYTES # BLD: 0.4 K/UL (ref 0–0.8)
MONOCYTES NFR BLD: 14.4 % (ref 0–10)
NEUTROPHILS # BLD: 0.7 K/UL (ref 1.8–7)
NEUTROPHILS NFR BLD AUTO: 30.2 % (ref 50–75)
NRBC BLD AUTO-RTO: 0.2 % (ref 0–2)
PH UR STRIP: 6 [PH] (ref 5–8)
PLATELET # BLD: 163 K/UL (ref 130–400)
PMV BLD AUTO: 7.2 FL (ref 7.2–11.7)
PROT UR STRIP-MCNC: NEGATIVE MG/DL
RBC # BLD AUTO: 3.41 MIL/UL (ref 4.4–5.9)
RBC # UR STRIP: NEGATIVE /UL
SALICYLATE: < 1 MG/DL 1
SP GR UR STRIP: 1.01 (ref 1–1.03)
SQUAMOUS EPITHIAL: < 1 /HPF (ref 0–5)
UROBILINOGEN UR-MCNC: NORMAL MG/DL (ref 0.2–1)
WBC # BLD AUTO: 2.4 K/UL (ref 4.8–10.8)

## 2018-07-12 RX ADMIN — MAGNESIUM SULFATE IN DEXTROSE SCH MLS/HR: 10 INJECTION, SOLUTION INTRAVENOUS at 09:50

## 2018-07-12 RX ADMIN — MAGNESIUM SULFATE IN DEXTROSE SCH MLS/HR: 10 INJECTION, SOLUTION INTRAVENOUS at 11:33

## 2018-07-13 ENCOUNTER — HOSPITAL ENCOUNTER (EMERGENCY)
Dept: HOSPITAL 31 - C.ER | Age: 48
LOS: 1 days | Discharge: HOME | End: 2018-07-14
Payer: MEDICARE

## 2018-07-13 VITALS — DIASTOLIC BLOOD PRESSURE: 81 MMHG | SYSTOLIC BLOOD PRESSURE: 134 MMHG | OXYGEN SATURATION: 99 % | TEMPERATURE: 97.7 F

## 2018-07-13 VITALS — BODY MASS INDEX: 50.6 KG/M2

## 2018-07-13 VITALS — HEART RATE: 70 BPM
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
## 2018-07-13 DIAGNOSIS — S00.511A: Primary | ICD-10-CM

## 2018-07-13 DIAGNOSIS — F10.129: ICD-10-CM
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
## 2018-07-13 LAB
ALBUMIN SERPL-MCNC: 2.5 G/DL (ref 3.5–5)
ALBUMIN/GLOB SERPL: 0.6 {RATIO} (ref 1–2.1)
ALT SERPL-CCNC: 28 U/L (ref 21–72)
AST SERPL-CCNC: 60 U/L (ref 17–59)
BASOPHILS # BLD AUTO: 0 K/UL (ref 0–0.2)
BASOPHILS NFR BLD: 0.8 % (ref 0–2)
BUN SERPL-MCNC: 8 MG/DL (ref 9–20)
CALCIUM SERPL-MCNC: 7.5 MG/DL (ref 8.6–10.4)
EOSINOPHIL # BLD AUTO: 0.1 K/UL (ref 0–0.7)
EOSINOPHIL NFR BLD: 5.5 % (ref 0–4)
ERYTHROCYTE [DISTWIDTH] IN BLOOD BY AUTOMATED COUNT: 21.4 % (ref 11.5–14.5)
GFR NON-AFRICAN AMERICAN: > 60
HGB BLD-MCNC: 10.2 G/DL (ref 12–18)
LYMPHOCYTES # BLD AUTO: 0.9 K/UL (ref 1–4.3)
LYMPHOCYTES NFR BLD AUTO: 33.9 % (ref 20–40)
MCH RBC QN AUTO: 30.4 PG (ref 27–31)
MCHC RBC AUTO-ENTMCNC: 33.8 G/DL (ref 33–37)
MCV RBC AUTO: 90 FL (ref 80–94)
MONOCYTES # BLD: 0.4 K/UL (ref 0–0.8)
MONOCYTES NFR BLD: 15.1 % (ref 0–10)
NEUTROPHILS # BLD: 1.2 K/UL (ref 1.8–7)
NEUTROPHILS NFR BLD AUTO: 44.7 % (ref 50–75)
NRBC BLD AUTO-RTO: 0.1 % (ref 0–2)
PLATELET # BLD: 153 K/UL (ref 130–400)
PMV BLD AUTO: 8.2 FL (ref 7.2–11.7)
RBC # BLD AUTO: 3.35 MIL/UL (ref 4.4–5.9)
WBC # BLD AUTO: 2.6 K/UL (ref 4.8–10.8)

## 2018-07-14 VITALS
TEMPERATURE: 98.5 F | DIASTOLIC BLOOD PRESSURE: 74 MMHG | HEART RATE: 87 BPM | SYSTOLIC BLOOD PRESSURE: 120 MMHG | RESPIRATION RATE: 16 BRPM

## 2018-07-16 VITALS — OXYGEN SATURATION: 98 %

## 2018-07-17 ENCOUNTER — HOSPITAL ENCOUNTER (EMERGENCY)
Dept: HOSPITAL 14 - H.ER | Age: 48
Discharge: HOME | End: 2018-07-17
Payer: MEDICARE

## 2018-07-17 VITALS
HEART RATE: 78 BPM | DIASTOLIC BLOOD PRESSURE: 78 MMHG | RESPIRATION RATE: 19 BRPM | TEMPERATURE: 97 F | SYSTOLIC BLOOD PRESSURE: 127 MMHG

## 2018-07-17 VITALS — OXYGEN SATURATION: 98 %

## 2018-07-17 VITALS — BODY MASS INDEX: 40.6 KG/M2

## 2018-07-17 DIAGNOSIS — F10.129: Primary | ICD-10-CM

## 2018-07-17 DIAGNOSIS — I11.0: ICD-10-CM

## 2018-07-17 DIAGNOSIS — F31.9: ICD-10-CM

## 2018-07-17 DIAGNOSIS — I50.9: ICD-10-CM

## 2018-07-17 DIAGNOSIS — F20.9: ICD-10-CM

## 2018-07-17 DIAGNOSIS — F41.9: ICD-10-CM

## 2018-07-17 LAB
ALBUMIN SERPL-MCNC: 3.4 G/DL (ref 3.5–5)
ALBUMIN/GLOB SERPL: 0.6 {RATIO} (ref 1–2.1)
ALT SERPL-CCNC: 29 U/L (ref 21–72)
AST SERPL-CCNC: 98 U/L (ref 17–59)
BASOPHILS # BLD AUTO: 0 K/UL (ref 0–0.2)
BASOPHILS NFR BLD: 0.7 % (ref 0–2)
BUN SERPL-MCNC: 4 MG/DL (ref 9–20)
CALCIUM SERPL-MCNC: 8 MG/DL (ref 8.4–10.2)
EOSINOPHIL # BLD AUTO: 0.3 K/UL (ref 0–0.7)
EOSINOPHIL NFR BLD: 6 % (ref 0–4)
ERYTHROCYTE [DISTWIDTH] IN BLOOD BY AUTOMATED COUNT: 21.7 % (ref 11.5–14.5)
GFR NON-AFRICAN AMERICAN: > 60
HGB BLD-MCNC: 12.3 G/DL (ref 12–18)
LYMPHOCYTES # BLD AUTO: 2.4 K/UL (ref 1–4.3)
LYMPHOCYTES NFR BLD AUTO: 55.7 % (ref 20–40)
MCH RBC QN AUTO: 30.2 PG (ref 27–31)
MCHC RBC AUTO-ENTMCNC: 33.7 G/DL (ref 33–37)
MCV RBC AUTO: 89.7 FL (ref 80–94)
MONOCYTES # BLD: 0.4 K/UL (ref 0–0.8)
MONOCYTES NFR BLD: 9.9 % (ref 0–10)
NEUTROPHILS # BLD: 1.2 K/UL (ref 1.8–7)
NEUTROPHILS NFR BLD AUTO: 27.7 % (ref 50–75)
NRBC BLD AUTO-RTO: 0.7 % (ref 0–0)
PLATELET # BLD: 191 K/UL (ref 130–400)
PMV BLD AUTO: 7.4 FL (ref 7.2–11.7)
RBC # BLD AUTO: 4.08 MIL/UL (ref 4.4–5.9)
WBC # BLD AUTO: 4.3 K/UL (ref 4.8–10.8)

## 2018-07-17 PROCEDURE — 80053 COMPREHEN METABOLIC PANEL: CPT

## 2018-07-17 PROCEDURE — 82948 REAGENT STRIP/BLOOD GLUCOSE: CPT

## 2018-07-17 PROCEDURE — 85025 COMPLETE CBC W/AUTO DIFF WBC: CPT

## 2018-07-17 PROCEDURE — 99283 EMERGENCY DEPT VISIT LOW MDM: CPT

## 2018-07-17 NOTE — ED PDOC
HPI: Psych/Substance Abuse


Time Seen by Provider: 18 02:13


Chief Complaint (Nursing): Alcohol Ingestion


Chief Complaint (Provider): Alcohol Ingestion


History Per: Patient, EMS


History/Exam Limitations: no limitations


Onset/Duration Of Symptoms: Other (prior to arrival)


Current Symptoms Are (Timing): Still Present


Additional Complaint(s): 


47 y/o male brought to ED by EMS due to public intoxication. Patient denies any 

medical complaints at this time. 





PMD: None reported








Past Medical History


Reviewed: Historical Data, Nursing Documentation, Vital Signs


Vital Signs: 





 Last Vital Signs











Temp  98.6 F   18 01:43


 


Pulse  89   18 01:43


 


Resp      


 


BP  136/89   18 01:43


 


Pulse Ox  98   18 01:43














- Medical History


PMH: Anxiety, Arthritis, Asthma, Bipolar Disorder, CHF, COPD (ASTHMA), 

Depression, Gall Bladder Disease, HTN, Schizophrenia


   Denies: Diabetes, Hepatitis, Chronic Kidney Disease, Sexually Transmitted 

Disease





- Surgical History


Surgical History: Appendectomy, Cholecystectomy


   Denies: CABG, Coronary Stent, Pacemaker, Tonsillectomy





- Family History


Family History: States: Unknown Family Hx





- Immunization History


Hx Tetanus Toxoid Vaccination: No


Hx Influenza Vaccination: Yes


Hx Pneumococcal Vaccination: No





- Home Medications


Home Medications: 


 Ambulatory Orders











 Medication  Instructions  Recorded


 


Folic Acid 1 mg PO DAILY #0 tab 16


 


Aspirin [Aspirin Chewable] 81 mg PO DAILY #30 chew 17


 


Cilostazol [Pletal] 100 mg PO BID #60 tab 17


 


Ferrous Sulfate 325 mg PO DAILY #30 tablet 17


 


Midodrine [Proamatine] 5 mg PO TID #90 tab 17


 


Multivitamins [Hexavitamin] 1 tab PO DAILY #30 tab 17


 


rifAXIMin [Xifaxan] 550 mg PO BID #60 tab 17


 


Dextromethorphan HBr/Quinidine 1 each PO BID 18





[Nuedexta 20-10 mg Capsule]  


 


Fluticasone Nasal [Flonase] 0.05 mg NS DAILY 18


 


Magnesium Oxide [Magnesium] 400 mg PO BID 18


 


Memantine HCl 10 mg PO DAILY 18


 


Omeprazole 40 mg PO DAILY 18


 


Furosemide [Lasix] 20 mg PO BID #30 tab 18


 


Lactulose [Enulose] 30 gm PO DAILY #30 udc 18


 


Spironolactone [Aldactone] 25 mg PO DAILY #30 tab 18


 


Famotidine [Pepcid] 20 mg PO BID 18


 


Gabapentin [Neurontin] 400 mg PO TID 18


 


Lactulose [Enulose] 20 gm PO TID #42 udc 18














- Allergies


Allergies/Adverse Reactions: 


 Allergies











Allergy/AdvReac Type Severity Reaction Status Date / Time


 


No Known Allergies Allergy   Verified 18 07:43














Review of Systems


Review Of Systems: ROS cannot be obtained secondary to pt's inabilty to answer 

questions.





Physical Exam





- Reviewed


Nursing Documentation Reviewed: Yes


Vital Signs Reviewed: Yes





- Physical Exam


Appears: Positive for: Non-toxic, No Acute Distress


Head Exam: Positive for: ATRAUMATIC, NORMAL INSPECTION, NORMOCEPHALIC


Skin: Positive for: Normal Color, Warm, Dry.  Negative for: Rash


Eye Exam: Positive for: EOMI, Normal appearance, PERRL


Neck: Positive for: Normal, Painless ROM, Supple


Cardiovascular/Chest: Positive for: Regular Rate, Rhythm.  Negative for: Murmur


Respiratory: Positive for: Normal Breath Sounds.  Negative for: Respiratory 

Distress


Gastrointestinal/Abdominal: Positive for: Normal Exam, Soft.  Negative for: 

Tenderness


Back: Positive for: Normal Inspection.  Negative for: L CVA Tenderness, R CVA 

Tenderness, Vertebral Tenderness


Extremity: Positive for: Normal ROM.  Negative for: Pedal Edema, Deformity


Neurologic/Psych: Positive for: Alert (slurred speech), Oriented.  Negative for

: Motor/Sensory Deficits





- Laboratory Results


Result Diagrams: 


 18 03:56





 18 03:56





- ECG


O2 Sat by Pulse Oximetry: 98 (RA)


Pulse Ox Interpretation: Normal





Medical Decision Making


Medical Decision Makin:18


Impression: 47 y/o male brought to ED due to alcohol abuse


Plan:


-Alcohol serum


-CMP


-Drug screen


-Urine dipstick


-CBC


-Accucheck


-Reevaluation





06:30


Upon reevaluation, patient has steady gait and clear speech. He is stable for 

discharge. 





--------------------------------------------------------------------------------

----------------------------------


Scribe Attestation:


Documented by Enio Miramontes, acting as a scribe for Alejandro Jones MD. 





Provider Scribe Attestation:


All medical record entries made by the Scribe were at my direction and 

personally dictated by me. I have reviewed the chart and agree that the record 

accurately reflects my personal performance of the history, physical exam, 

medical decision making, and the department course for this patient. I have 

also personally directed, reviewed, and agree with the discharge instructions 

and disposition.











Disposition





- Clinical Impression


Clinical Impression: 


 Alcohol abuse with intoxication








- Patient ED Disposition


Is Patient to be Admitted: No


Counseled Patient/Family Regarding: Studies Performed, Diagnosis, Need For 

Followup





- Disposition


Referrals: 


Irlanda Parra MD [Primary Care Provider] - 


Disposition: Routine/Home


Disposition Time: 06:30


Condition: STABLE


Additional Instructions: 





HECTOR GALVAN, thank you for letting us take care of you today. Your provider 

was Alejandro Jones MD and you were treated for ETOH. The emergency medical 

care you received today was directed at your acute symptoms. If you were 

prescribed any medication, please fill it and take as directed. It may take 

several days for your symptoms to resolve. Return to the Emergency Department 

if your symptoms worsen, do not improve, or if you have any other problems.





Please contact your doctor or call one of the physicians/clinics you have been 

referred to that are listed on the Patient Visit Information form that is 

included in your discharge packet. Bring any paperwork you were given at 

discharge with you along with any medications you are taking to your follow up 

visit. Our treatment cannot replace ongoing medical care by a primary care 

provider outside of the emergency department.





Thank you for allowing the [facility] team to be part of your care today.








If you had an X-Ray or CT scan: A Radiologist will review the ED reading if any 

change in treatment is needed we will contact you.***





If you had a blood, urine, or wound culture: It will take several days for the 

results, if any change in treatment is needed we will contact you.***





If you had an STI test: It will take 48 hours for the results. Please call 

after 1 week if you have not heard back.***


Instructions:  Effects of Alcohol on Your Health


Forms:  [facility] (English)

## 2018-07-19 ENCOUNTER — HOSPITAL ENCOUNTER (EMERGENCY)
Dept: HOSPITAL 31 - C.ER | Age: 48
LOS: 1 days | Discharge: HOME | End: 2018-07-20
Payer: MEDICARE

## 2018-07-19 VITALS — BODY MASS INDEX: 40.6 KG/M2

## 2018-07-19 DIAGNOSIS — I11.0: ICD-10-CM

## 2018-07-19 DIAGNOSIS — F41.9: ICD-10-CM

## 2018-07-19 DIAGNOSIS — K74.60: ICD-10-CM

## 2018-07-19 DIAGNOSIS — I50.9: ICD-10-CM
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
## 2018-07-19 DIAGNOSIS — F10.129: Primary | ICD-10-CM

## 2018-07-19 DIAGNOSIS — F20.9: ICD-10-CM

## 2018-07-19 LAB
ALBUMIN SERPL-MCNC: 3.2 G/DL (ref 3.5–5)
ALBUMIN/GLOB SERPL: 0.6 {RATIO} (ref 1–2.1)
ALT SERPL-CCNC: 38 U/L (ref 21–72)
AST SERPL-CCNC: 82 U/L (ref 17–59)
BASOPHILS # BLD AUTO: 0 K/UL (ref 0–0.2)
BASOPHILS NFR BLD: 0.6 % (ref 0–2)
BILIRUB UR-MCNC: NEGATIVE MG/DL
BUN SERPL-MCNC: 3 MG/DL (ref 9–20)
CALCIUM SERPL-MCNC: 7.9 MG/DL (ref 8.6–10.4)
EOSINOPHIL # BLD AUTO: 0.2 K/UL (ref 0–0.7)
EOSINOPHIL NFR BLD: 3.7 % (ref 0–4)
ERYTHROCYTE [DISTWIDTH] IN BLOOD BY AUTOMATED COUNT: 21.2 % (ref 11.5–14.5)
GFR NON-AFRICAN AMERICAN: > 60
GLUCOSE UR STRIP-MCNC: NORMAL MG/DL
HGB BLD-MCNC: 12.1 G/DL (ref 12–18)
LEUKOCYTE ESTERASE UR-ACNC: (no result) LEU/UL
LYMPHOCYTES # BLD AUTO: 2.1 K/UL (ref 1–4.3)
LYMPHOCYTES NFR BLD AUTO: 46 % (ref 20–40)
MCH RBC QN AUTO: 30.1 PG (ref 27–31)
MCHC RBC AUTO-ENTMCNC: 33.8 G/DL (ref 33–37)
MCV RBC AUTO: 89 FL (ref 80–94)
MONOCYTES # BLD: 0.5 K/UL (ref 0–0.8)
MONOCYTES NFR BLD: 10.5 % (ref 0–10)
NEUTROPHILS # BLD: 1.8 K/UL (ref 1.8–7)
NEUTROPHILS NFR BLD AUTO: 39.2 % (ref 50–75)
NRBC BLD AUTO-RTO: 0.1 % (ref 0–2)
PH UR STRIP: 7 [PH] (ref 5–8)
PLATELET # BLD: 159 K/UL (ref 130–400)
PMV BLD AUTO: 7.3 FL (ref 7.2–11.7)
PROT UR STRIP-MCNC: NEGATIVE MG/DL
RBC # BLD AUTO: 4.03 MIL/UL (ref 4.4–5.9)
RBC # UR STRIP: NEGATIVE /UL
SP GR UR STRIP: 1 (ref 1–1.03)
UROBILINOGEN UR-MCNC: NORMAL MG/DL (ref 0.2–1)
WBC # BLD AUTO: 4.6 K/UL (ref 4.8–10.8)

## 2018-07-19 PROCEDURE — 84484 ASSAY OF TROPONIN QUANT: CPT

## 2018-07-19 PROCEDURE — 96372 THER/PROPH/DIAG INJ SC/IM: CPT

## 2018-07-19 PROCEDURE — 71045 X-RAY EXAM CHEST 1 VIEW: CPT

## 2018-07-19 PROCEDURE — 81001 URINALYSIS AUTO W/SCOPE: CPT

## 2018-07-19 PROCEDURE — 83880 ASSAY OF NATRIURETIC PEPTIDE: CPT

## 2018-07-19 PROCEDURE — 70450 CT HEAD/BRAIN W/O DYE: CPT

## 2018-07-19 PROCEDURE — 80053 COMPREHEN METABOLIC PANEL: CPT

## 2018-07-19 PROCEDURE — 85025 COMPLETE CBC W/AUTO DIFF WBC: CPT

## 2018-07-19 PROCEDURE — 99285 EMERGENCY DEPT VISIT HI MDM: CPT

## 2018-07-20 ENCOUNTER — HOSPITAL ENCOUNTER (INPATIENT)
Dept: HOSPITAL 31 - C.ER | Age: 48
LOS: 1 days | Discharge: LEFT BEFORE DISCHARGE | DRG: 310 | End: 2018-07-21
Attending: INTERNAL MEDICINE | Admitting: INTERNAL MEDICINE
Payer: MEDICARE

## 2018-07-20 VITALS — BODY MASS INDEX: 40.6 KG/M2

## 2018-07-20 VITALS
DIASTOLIC BLOOD PRESSURE: 84 MMHG | RESPIRATION RATE: 18 BRPM | TEMPERATURE: 98 F | SYSTOLIC BLOOD PRESSURE: 137 MMHG | HEART RATE: 86 BPM | OXYGEN SATURATION: 98 %

## 2018-07-20 DIAGNOSIS — J44.9: ICD-10-CM

## 2018-07-20 DIAGNOSIS — F20.9: ICD-10-CM

## 2018-07-20 DIAGNOSIS — F31.9: ICD-10-CM

## 2018-07-20 DIAGNOSIS — F10.10: ICD-10-CM

## 2018-07-20 DIAGNOSIS — I50.9: ICD-10-CM

## 2018-07-20 DIAGNOSIS — I11.0: ICD-10-CM

## 2018-07-20 DIAGNOSIS — I48.92: Primary | ICD-10-CM

## 2018-07-20 LAB
BNP SERPL-MCNC: 133 PG/ML (ref 0–450)
TROPONIN I SERPL-MCNC: 0.02 NG/ML (ref 0–0.12)

## 2018-07-21 VITALS — OXYGEN SATURATION: 95 % | SYSTOLIC BLOOD PRESSURE: 128 MMHG | DIASTOLIC BLOOD PRESSURE: 77 MMHG | TEMPERATURE: 98 F

## 2018-07-21 VITALS — HEART RATE: 112 BPM

## 2018-07-21 VITALS — RESPIRATION RATE: 20 BRPM

## 2018-07-21 LAB
ALBUMIN SERPL-MCNC: 2.9 G/DL (ref 3.5–5)
ALBUMIN/GLOB SERPL: 0.5 {RATIO} (ref 1–2.1)
ALT SERPL-CCNC: 29 U/L (ref 21–72)
AST SERPL-CCNC: 81 U/L (ref 17–59)
BASOPHILS # BLD AUTO: 0 K/UL (ref 0–0.2)
BASOPHILS NFR BLD: 0.7 % (ref 0–2)
BNP SERPL-MCNC: 171 PG/ML (ref 0–450)
BUN SERPL-MCNC: 3 MG/DL (ref 9–20)
CALCIUM SERPL-MCNC: 7.9 MG/DL (ref 8.6–10.4)
EOSINOPHIL # BLD AUTO: 0.2 K/UL (ref 0–0.7)
EOSINOPHIL NFR BLD: 6.7 % (ref 0–4)
ERYTHROCYTE [DISTWIDTH] IN BLOOD BY AUTOMATED COUNT: 21.3 % (ref 11.5–14.5)
GFR NON-AFRICAN AMERICAN: > 60
HGB BLD-MCNC: 12.3 G/DL (ref 12–18)
LIPASE: 281 U/L (ref 23–300)
LYMPHOCYTES # BLD AUTO: 1.3 K/UL (ref 1–4.3)
LYMPHOCYTES NFR BLD AUTO: 52.3 % (ref 20–40)
MCH RBC QN AUTO: 30.1 PG (ref 27–31)
MCHC RBC AUTO-ENTMCNC: 33.2 G/DL (ref 33–37)
MCV RBC AUTO: 90.6 FL (ref 80–94)
MONOCYTES # BLD: 0.4 K/UL (ref 0–0.8)
MONOCYTES NFR BLD: 14.5 % (ref 0–10)
NEUTROPHILS # BLD: 0.7 K/UL (ref 1.8–7)
NEUTROPHILS NFR BLD AUTO: 25.8 % (ref 50–75)
NRBC BLD AUTO-RTO: 0.2 % (ref 0–2)
PLATELET # BLD: 117 K/UL (ref 130–400)
PMV BLD AUTO: 7.8 FL (ref 7.2–11.7)
RBC # BLD AUTO: 4.08 MIL/UL (ref 4.4–5.9)
WBC # BLD AUTO: 2.5 K/UL (ref 4.8–10.8)

## 2018-07-25 ENCOUNTER — HOSPITAL ENCOUNTER (EMERGENCY)
Dept: HOSPITAL 14 - H.ER | Age: 48
LOS: 1 days | Discharge: HOME | End: 2018-07-26
Payer: MEDICARE

## 2018-07-25 VITALS — BODY MASS INDEX: 40.6 KG/M2

## 2018-07-25 DIAGNOSIS — F10.121: Primary | ICD-10-CM

## 2018-07-25 DIAGNOSIS — F20.9: ICD-10-CM

## 2018-07-25 DIAGNOSIS — I11.0: ICD-10-CM

## 2018-07-25 DIAGNOSIS — Z79.82: ICD-10-CM

## 2018-07-25 DIAGNOSIS — F41.9: ICD-10-CM

## 2018-07-25 DIAGNOSIS — F31.9: ICD-10-CM

## 2018-07-25 PROCEDURE — 82948 REAGENT STRIP/BLOOD GLUCOSE: CPT

## 2018-07-25 PROCEDURE — 99282 EMERGENCY DEPT VISIT SF MDM: CPT

## 2018-07-26 ENCOUNTER — HOSPITAL ENCOUNTER (INPATIENT)
Dept: HOSPITAL 14 - H.ER | Age: 48
LOS: 4 days | Discharge: HOME | DRG: 433 | End: 2018-07-30
Attending: FAMILY MEDICINE | Admitting: FAMILY MEDICINE
Payer: MEDICARE

## 2018-07-26 ENCOUNTER — HOSPITAL ENCOUNTER (EMERGENCY)
Dept: HOSPITAL 14 - H.ER | Age: 48
Discharge: HOME | End: 2018-07-26
Payer: MEDICARE

## 2018-07-26 VITALS
TEMPERATURE: 98 F | RESPIRATION RATE: 20 BRPM | DIASTOLIC BLOOD PRESSURE: 78 MMHG | HEART RATE: 67 BPM | SYSTOLIC BLOOD PRESSURE: 140 MMHG

## 2018-07-26 VITALS
HEART RATE: 80 BPM | SYSTOLIC BLOOD PRESSURE: 126 MMHG | RESPIRATION RATE: 18 BRPM | TEMPERATURE: 98.2 F | DIASTOLIC BLOOD PRESSURE: 72 MMHG

## 2018-07-26 VITALS — BODY MASS INDEX: 40.6 KG/M2

## 2018-07-26 VITALS — OXYGEN SATURATION: 100 %

## 2018-07-26 DIAGNOSIS — E87.6: ICD-10-CM

## 2018-07-26 DIAGNOSIS — F41.9: ICD-10-CM

## 2018-07-26 DIAGNOSIS — F10.129: ICD-10-CM

## 2018-07-26 DIAGNOSIS — Z86.73: ICD-10-CM

## 2018-07-26 DIAGNOSIS — F31.9: ICD-10-CM

## 2018-07-26 DIAGNOSIS — E83.42: ICD-10-CM
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
## 2018-07-26 DIAGNOSIS — I11.0: ICD-10-CM

## 2018-07-26 DIAGNOSIS — E83.51: ICD-10-CM

## 2018-07-26 DIAGNOSIS — I73.9: ICD-10-CM

## 2018-07-26 DIAGNOSIS — E66.01: ICD-10-CM

## 2018-07-26 DIAGNOSIS — E78.5: ICD-10-CM

## 2018-07-26 DIAGNOSIS — I48.91: ICD-10-CM

## 2018-07-26 DIAGNOSIS — F20.9: ICD-10-CM

## 2018-07-26 DIAGNOSIS — D64.9: ICD-10-CM

## 2018-07-26 DIAGNOSIS — J44.9: ICD-10-CM

## 2018-07-26 DIAGNOSIS — K70.30: Primary | ICD-10-CM

## 2018-07-26 DIAGNOSIS — F17.210: ICD-10-CM

## 2018-07-26 DIAGNOSIS — Z90.49: ICD-10-CM

## 2018-07-26 DIAGNOSIS — I50.9: ICD-10-CM

## 2018-07-26 DIAGNOSIS — K72.90: ICD-10-CM

## 2018-07-26 DIAGNOSIS — F10.129: Primary | ICD-10-CM

## 2018-07-26 DIAGNOSIS — Z59.0: ICD-10-CM

## 2018-07-26 DIAGNOSIS — Z79.82: ICD-10-CM

## 2018-07-26 LAB
ALBUMIN SERPL-MCNC: 3.3 G/DL (ref 3.5–5)
ALBUMIN/GLOB SERPL: 0.6 {RATIO} (ref 1–2.1)
ALT SERPL-CCNC: 30 U/L (ref 21–72)
AST SERPL-CCNC: 91 U/L (ref 17–59)
BASOPHILS # BLD AUTO: 0 K/UL (ref 0–0.2)
BASOPHILS NFR BLD: 0.5 % (ref 0–2)
BUN SERPL-MCNC: 5 MG/DL (ref 9–20)
CALCIUM SERPL-MCNC: 8.1 MG/DL (ref 8.4–10.2)
EOSINOPHIL # BLD AUTO: 0 K/UL (ref 0–0.7)
EOSINOPHIL NFR BLD: 1 % (ref 0–4)
ERYTHROCYTE [DISTWIDTH] IN BLOOD BY AUTOMATED COUNT: 20.4 % (ref 11.5–14.5)
GFR NON-AFRICAN AMERICAN: > 60
HEPATITIS A IGM: NEGATIVE
HEPATITIS B CORE AB: NEGATIVE
HEPATITIS C ANTIBODY: NEGATIVE
HGB BLD-MCNC: 12.9 G/DL (ref 12–18)
INR PPP: 1.6 (ref 0.9–1.2)
LIPASE SERPL-CCNC: 90 U/L (ref 23–300)
LYMPHOCYTES # BLD AUTO: 0.7 K/UL (ref 1–4.3)
LYMPHOCYTES NFR BLD AUTO: 27.1 % (ref 20–40)
MCH RBC QN AUTO: 30.5 PG (ref 27–31)
MCHC RBC AUTO-ENTMCNC: 34.7 G/DL (ref 33–37)
MCV RBC AUTO: 87.8 FL (ref 80–94)
MONOCYTES # BLD: 0.4 K/UL (ref 0–0.8)
MONOCYTES NFR BLD: 16.9 % (ref 0–10)
NEUTROPHILS # BLD: 1.4 K/UL (ref 1.8–7)
NEUTROPHILS NFR BLD AUTO: 54.5 % (ref 50–75)
NRBC BLD AUTO-RTO: 0.3 % (ref 0–0)
PLATELET # BLD: 119 K/UL (ref 130–400)
PMV BLD AUTO: 7.4 FL (ref 7.2–11.7)
PROTHROMBIN TIME: 17.8 SECONDS (ref 9.8–13.1)
RBC # BLD AUTO: 4.22 MIL/UL (ref 4.4–5.9)
WBC # BLD AUTO: 2.5 K/UL (ref 4.8–10.8)

## 2018-07-26 RX ADMIN — POTASSIUM CHLORIDE SCH MLS/HR: 14.9 INJECTION, SOLUTION INTRAVENOUS at 13:46

## 2018-07-26 RX ADMIN — POTASSIUM CHLORIDE SCH MLS/HR: 14.9 INJECTION, SOLUTION INTRAVENOUS at 14:42

## 2018-07-26 RX ADMIN — POTASSIUM CHLORIDE SCH MLS/HR: 14.9 INJECTION, SOLUTION INTRAVENOUS at 15:45

## 2018-07-26 RX ADMIN — POTASSIUM PHOSPHATE, MONOBASIC AND POTASSIUM PHOSPHATE, DIBASIC SCH MLS/HR: 224; 236 INJECTION, SOLUTION, CONCENTRATE INTRAVENOUS at 21:47

## 2018-07-26 RX ADMIN — POTASSIUM PHOSPHATE, MONOBASIC AND POTASSIUM PHOSPHATE, DIBASIC SCH MLS/HR: 224; 236 INJECTION, SOLUTION, CONCENTRATE INTRAVENOUS at 20:00

## 2018-07-26 RX ADMIN — POTASSIUM PHOSPHATE, MONOBASIC AND POTASSIUM PHOSPHATE, DIBASIC SCH MLS/HR: 224; 236 INJECTION, SOLUTION, CONCENTRATE INTRAVENOUS at 17:25

## 2018-07-26 SDOH — ECONOMIC STABILITY - HOUSING INSECURITY: HOMELESSNESS: Z59.0

## 2018-07-26 NOTE — CT
Date of service: 



07/26/2018



PROCEDURE:  CT HEAD WITHOUT CONTRAST.



HISTORY:

Dizziness and vomiting. 



COMPARISON:

05/27/2017. 



TECHNIQUE:

Axial computed tomography images were obtained through the head/brain 

without intravenous contrast.  



Coronal and sagittal reconstructed images.



Radiation dose:



Total exam DLP = 1003.16 mGy-cm.



This CT exam was performed using one or more of the following dose 

reduction techniques: Automated exposure control, adjustment of the 

mA and/or kV according to patient size, and/or use of iterative 

reconstruction technique.



FINDINGS:



HEMORRHAGE:

No intracranial hemorrhage. 



BRAIN:

No mass effect or edema.  No atrophy or chronic microvascular 

ischemic changes.



VENTRICLES:

Unremarkable. No hydrocephalus. 



CALVARIUM:

Unremarkable.



PARANASAL SINUSES:

Unremarkable as visualized. No significant inflammatory changes.



MASTOID AIR CELLS:

Unremarkable as visualized. No inflammatory changes.



OTHER FINDINGS:

None.



IMPRESSION:

No acute intracranial abnormalities. No significant findings to 

account for the clinical presentation. No significant interval change 

compared to the prior examination(s).

## 2018-07-26 NOTE — CARD
--------------- APPROVED REPORT --------------





Date of service: 07/26/2018



EKG Measurement

Heart [id number]

UT [id number]

[id number]

[id number]

[id number]



<Conclusion>

Sinus rhythm with premature atrial complexes

ST & T wave abnormality, consider inferior ischemia

ST & T wave abnormality, consider anterior ischemia

Prolonged QT

Abnormal ECG
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
## 2018-07-26 NOTE — ED PDOC
HPI: Psych/Substance Abuse


Time Seen by Provider: 07/26/18 07:34


Chief Complaint (Nursing): Alcohol Ingestion


Chief Complaint (Provider): Nausea/Vomiting


History Per: Patient


History/Exam Limitations: no limitations


Onset/Duration Of Symptoms: Hrs


Additional Complaint(s): 


47 y/o male presents to the ED complaining of nausea and headache. Patient was 

discharged from the ED for alcohol intoxication last night. Patient returned 

for symptom relief. 





PMD: None Provided








Past Medical History


Reviewed: Historical Data, Nursing Documentation, Vital Signs


Vital Signs: 





 Last Vital Signs











Temp  98.7 F   07/26/18 07:11


 


Pulse  87   07/26/18 07:11


 


Resp  18   07/26/18 07:11


 


BP  136/82   07/26/18 07:11


 


Pulse Ox  100   07/26/18 07:11














- Medical History


PMH: Anxiety, Arthritis, Asthma, Bipolar Disorder, CHF, COPD (ASTHMA), 

Depression, Gall Bladder Disease, HTN, Schizophrenia


   Denies: Diabetes, Hepatitis, HIV, Chronic Kidney Disease, Seizures, Sexually 

Transmitted Disease





- Surgical History


Surgical History: Appendectomy, Cholecystectomy


   Denies: CABG, Coronary Stent, Pacemaker, Tonsillectomy





- Family History


Family History: States: Unknown Family Hx





- Immunization History


Hx Tetanus Toxoid Vaccination: No


Hx Influenza Vaccination: No


Hx Pneumococcal Vaccination: No





- Home Medications


Home Medications: 


 Ambulatory Orders











 Medication  Instructions  Recorded


 


Folic Acid 1 mg PO DAILY #0 tab 03/25/16


 


Fluticasone Nasal [Flonase] 2 spray NS DAILY PRN 01/08/18


 


Furosemide [Lasix] 20 mg PO BID #30 tab 01/11/18


 


Spironolactone [Aldactone] 25 mg PO DAILY #30 tab 01/11/18


 


Gabapentin [Neurontin] 400 mg PO TID 07/12/18


 


Albuterol Sulfate [Ventolin Hfa] 2 puff IH Q6 PRN 07/26/18


 


Aspirin [Ecotrin] 81 mg PO DAILY 07/26/18


 


Cilostazol [Pletal] 100 mg PO Q12 07/26/18


 


Famotidine [Pepcid] 40 mg PO DAILY 07/26/18


 


Lactulose [Generlac] 15 ml PO BID 07/26/18


 


Memantine [Namenda] 10 mg PO DAILY 07/26/18


 


Nitroglycerin [Nitrostat] 0.4 mg SL Q5MIN PRN 07/26/18


 


Sertraline [Zoloft] 25 mg PO DAILY 07/26/18


 


metOLazone [Zaroxolyn] 5 mg PO DAILY 07/26/18


 


rifAXIMin [Xifaxan] 550 mg PO Q12 07/26/18














- Allergies


Allergies/Adverse Reactions: 


 Allergies











Allergy/AdvReac Type Severity Reaction Status Date / Time


 


No Known Allergies Allergy   Verified 07/26/18 07:10














Review of Systems


ROS Statement: Except As Marked, All Systems Reviewed And Found Negative


Gastrointestinal: Positive for: Nausea


Neurological: Positive for: Headache





Physical Exam





- Reviewed


Nursing Documentation Reviewed: Yes


Vital Signs Reviewed: Yes





- Physical Exam


Appears: Positive for: No Acute Distress (sleeping comfortably but arousable to 

verbal stimuli )


Head Exam: Positive for: ATRAUMATIC


Skin: Positive for: Normal Color, Warm, Dry


Eye Exam: Positive for: Normal appearance, EOMI, PERRL


Neck: Positive for: Normal, Painless ROM


Cardiovascular/Chest: Positive for: Regular Rate, Rhythm.  Negative for: Murmur

, Bradycardia


Respiratory: Positive for: Normal Breath Sounds.  Negative for: Accessory 

Muscle Use, Respiratory Distress


Gastrointestinal/Abdominal: Positive for: Normal Exam, Soft.  Negative for: 

Tenderness


Back: Positive for: Normal Inspection.  Negative for: L CVA Tenderness, R CVA 

Tenderness, Vertebral Tenderness


Extremity: Positive for: Normal ROM.  Negative for: Pedal Edema, Deformity


Neurologic/Psych: Positive for: Alert, Oriented (x3), Gait (steady)





- ECG


O2 Sat by Pulse Oximetry: 100 (RA)


Pulse Ox Interpretation: Normal





Medical Decision Making


Medical Decision Making: 


Time: 0821


-- Patient was able to walk with a steady gait. Patient is now stable for 

discharge. 








_______________________________________________________________________


Scribe Attestation:


Documented by Cory Jimenez acting as a scribe for Dr. Sia Hewitt MD.





Provider Scribe Attestation:


All medical record entries made by the Scribe were at my direction and 

personally dictated by me. I have reviewed the chart and agree that the record 

accurately reflects my personal performance of the history, physical exam, 

medical decision making, and the department course for this patient. I have 

also personally directed, reviewed, and agree with the discharge instructions 

and disposition.





Disposition





- Clinical Impression


Clinical Impression: 


 Alcohol abuse








- Disposition


Referrals: 


[facility] [Outside]


Disposition: Routine/Home


Disposition Time: 08:22


Condition: STABLE


Instructions:  Alcohol Abuse and Alcoholism (DC)


Forms:  [facility] (English), [facility] (Latvian)


Print Language: Hebrew

## 2018-07-26 NOTE — ED PDOC
HPI: Psych/Substance Abuse


Time Seen by Provider: 07/26/18 00:16


Chief Complaint (Nursing): Alcohol Ingestion


Chief Complaint (Provider): Alcohol Ingestion


ED Caveat: Intoxicated


History Per: Patient


History/Exam Limitations: no limitations


Onset/Duration Of Symptoms: Hrs (PTA)


Current Symptoms Are (Timing): Still Present


Additional Complaint(s): 





48 year old male well known to this provider for ETOH intoxication was brought 

to ED by EMS due to public intoxication. Patient denies any medical complaints 

at this time. 














PMD: none provided





Past Medical History


Reviewed: Historical Data, Nursing Documentation, Vital Signs


Vital Signs: 





 Last Vital Signs











Temp  98.0 F   07/25/18 23:48


 


Pulse  77   07/25/18 23:48


 


Resp  16   07/25/18 23:48


 


BP  117/67   07/25/18 23:48


 


Pulse Ox  96   07/25/18 23:48














- Medical History


PMH: Anxiety, Arthritis, Asthma, Bipolar Disorder, CHF, COPD (ASTHMA), 

Depression, Gall Bladder Disease, HTN, Schizophrenia


   Denies: Diabetes, Hepatitis, HIV, Chronic Kidney Disease, Seizures, Sexually 

Transmitted Disease





- Surgical History


Surgical History: Appendectomy, Cholecystectomy


   Denies: CABG, Coronary Stent, Pacemaker, Tonsillectomy





- Family History


Family History: States: Unknown Family Hx





- Immunization History


Hx Tetanus Toxoid Vaccination: No


Hx Influenza Vaccination: No


Hx Pneumococcal Vaccination: No





- Home Medications


Home Medications: 


 Ambulatory Orders











 Medication  Instructions  Recorded


 


Folic Acid 1 mg PO DAILY #0 tab 03/25/16


 


Fluticasone Nasal [Flonase] 2 spray NS DAILY PRN 01/08/18


 


Furosemide [Lasix] 20 mg PO BID #30 tab 01/11/18


 


Spironolactone [Aldactone] 25 mg PO DAILY #30 tab 01/11/18


 


Gabapentin [Neurontin] 400 mg PO TID 07/12/18


 


Albuterol Sulfate [Ventolin Hfa] 2 puff IH Q6 PRN 07/26/18


 


Aspirin [Ecotrin] 81 mg PO DAILY 07/26/18


 


Cilostazol [Pletal] 100 mg PO Q12 07/26/18


 


Famotidine [Pepcid] 40 mg PO DAILY 07/26/18


 


Lactulose [Generlac] 15 ml PO BID 07/26/18


 


Memantine [Namenda] 10 mg PO DAILY 07/26/18


 


Nitroglycerin [Nitrostat] 0.4 mg SL Q5MIN PRN 07/26/18


 


Sertraline [Zoloft] 25 mg PO DAILY 07/26/18


 


metOLazone [Zaroxolyn] 5 mg PO DAILY 07/26/18


 


rifAXIMin [Xifaxan] 550 mg PO Q12 07/26/18














- Allergies


Allergies/Adverse Reactions: 


 Allergies











Allergy/AdvReac Type Severity Reaction Status Date / Time


 


No Known Allergies Allergy   Verified 07/26/18 07:10














Review of Systems


ROS Statement: Except As Marked, All Systems Reviewed And Found Negative


Psych: Positive for: Other (Intoxication)





Physical Exam





- Reviewed


Nursing Documentation Reviewed: Yes


Vital Signs Reviewed: Yes





- Physical Exam


Appears: Positive for: Non-toxic, No Acute Distress


Head Exam: Positive for: ATRAUMATIC, NORMOCEPHALIC


Skin: Positive for: Normal Color, Warm, Dry


Eye Exam: Positive for: EOMI, Normal appearance, PERRL


Cardiovascular/Chest: Positive for: Regular Rate, Rhythm.  Negative for: Murmur


Respiratory: Positive for: Normal Breath Sounds.  Negative for: Respiratory 

Distress


Gastrointestinal/Abdominal: Positive for: Normal Exam, Soft.  Negative for: 

Tenderness


Extremity: Positive for: Normal ROM (upper and lower)


Neurologic/Psych: Positive for: Alert





- ECG


O2 Sat by Pulse Oximetry: 96 (RA)


Pulse Ox Interpretation: Normal





Medical Decision Making


Medical Decision Making: 





Time: 00:59


Initial Impression 49 y/o with ETOH intoxication 


Initial Plan:


--Alcohol serum


--Accucheck





Time: 5:41


--Patient is clinically sober and ready for discharge home. Diagnosis alcohol 

intoxication.








--------------------------------------------------------------------------------

-----------------


Scribe Attestation:


Documented by Anay tSyles, acting as a scribe for Alejandro Jones MD





Provider Scribe Attestation:


All medical record entries made by the Scribe were at my direction and 

personally dictated by me. I have reviewed the chart and agree that the record 

accurately reflects my personal performance of the history, physical exam, 

medical decision making, and the department course for this patient. I have 

also personally directed, reviewed, and agree with the discharge instructions 

and disposition.





Disposition





- Clinical Impression


Clinical Impression: 


 Alcohol abuse with intoxication delirium








- Disposition


Referrals: 


Irlanda Parra MD [Primary Care Provider] - 


Disposition: Routine/Home


Disposition Time: 05:41


Condition: STABLE


Instructions:  Alcohol Use - When Is Drinking a Problem?


Forms:  [facility] (English)


Print Language: English

## 2018-07-27 VITALS — OXYGEN SATURATION: 96 %

## 2018-07-27 LAB
ALBUMIN SERPL-MCNC: 2.9 G/DL (ref 3.5–5)
ALBUMIN/GLOB SERPL: 0.5 {RATIO} (ref 1–2.1)
ALT SERPL-CCNC: 29 U/L (ref 21–72)
APTT BLD: 33.7 SECONDS (ref 25.6–37.1)
AST SERPL-CCNC: 83 U/L (ref 17–59)
BASOPHILS # BLD AUTO: 0 K/UL (ref 0–0.2)
BASOPHILS NFR BLD: 0.4 % (ref 0–2)
BUN SERPL-MCNC: 6 MG/DL (ref 9–20)
CALCIUM SERPL-MCNC: 7.8 MG/DL (ref 8.4–10.2)
EOSINOPHIL # BLD AUTO: 0.1 K/UL (ref 0–0.7)
EOSINOPHIL NFR BLD: 3 % (ref 0–4)
ERYTHROCYTE [DISTWIDTH] IN BLOOD BY AUTOMATED COUNT: 20.5 % (ref 11.5–14.5)
GFR NON-AFRICAN AMERICAN: > 60
HGB BLD-MCNC: 11.6 G/DL (ref 12–18)
INR PPP: 1.7 (ref 0.9–1.2)
LYMPHOCYTES # BLD AUTO: 0.8 K/UL (ref 1–4.3)
LYMPHOCYTES NFR BLD AUTO: 27.5 % (ref 20–40)
MCH RBC QN AUTO: 30.3 PG (ref 27–31)
MCHC RBC AUTO-ENTMCNC: 33.9 G/DL (ref 33–37)
MCV RBC AUTO: 89.4 FL (ref 80–94)
MONOCYTES # BLD: 0.6 K/UL (ref 0–0.8)
MONOCYTES NFR BLD: 18.5 % (ref 0–10)
NEUTROPHILS # BLD: 1.6 K/UL (ref 1.8–7)
NEUTROPHILS NFR BLD AUTO: 50.6 % (ref 50–75)
NRBC BLD AUTO-RTO: 0.2 % (ref 0–0)
PLATELET # BLD: 102 K/UL (ref 130–400)
PMV BLD AUTO: 7.8 FL (ref 7.2–11.7)
PROTHROMBIN TIME: 19.4 SECONDS (ref 9.8–13.1)
RBC # BLD AUTO: 3.82 MIL/UL (ref 4.4–5.9)
WBC # BLD AUTO: 3.1 K/UL (ref 4.8–10.8)

## 2018-07-27 RX ADMIN — POTASSIUM CHLORIDE SCH MEQ: 20 TABLET, EXTENDED RELEASE ORAL at 13:47

## 2018-07-27 RX ADMIN — CILOSTAZOL SCH MG: 100 TABLET ORAL at 08:56

## 2018-07-27 RX ADMIN — POTASSIUM CHLORIDE SCH MEQ: 20 TABLET, EXTENDED RELEASE ORAL at 21:59

## 2018-07-27 RX ADMIN — CILOSTAZOL SCH MG: 100 TABLET ORAL at 22:00

## 2018-07-27 RX ADMIN — POTASSIUM CHLORIDE SCH MEQ: 20 TABLET, EXTENDED RELEASE ORAL at 17:41

## 2018-07-27 NOTE — US
Date of service: 



07/27/2018



HISTORY:

vomiting, elevated LFT levels



COMPARISON:

Abdomen pelvis CT examination 09/30/2017.



TECHNIQUE:

Sonographic evaluation of the abdomen.



FINDINGS:

Study is compromised by body habitus overall. 



LIVER:

Measures 16.0 cm.  No definitive hepatic lesion is seen throughout 

the liver as imaged with hepatic parenchyma appearing mildly 

inhomogeneous in overall echotexture. Slightly nodular peripheral 

pattern reiterates cirrhosis. Normal directional blood flow 

identified at the portal vein.



GALLBLADDER:

Unremarkable. No gallstones.



COMMON BILE DUCT:

Measures 6.5 mm. No stones. No dilatation.



PANCREAS:

Pancreas is not identified due to body habitus and extensive 

overlying bowel gas.



RIGHT KIDNEY:

Measures 11.9cm. Normal echogenicity. No calculus, mass, or 

hydronephrosis.



LEFT KIDNEY:

Measures 12.9cm. Normal echogenicity. No calculus, mass, or 

hydronephrosis.



SPLEEN:

Spleen is enlarged without demonstrated mass associated. Spleen 

measures 17.6 cm maximum dimension.



AORTA:

Proximal to mid abdominal aorta is identified with the just distal 

segment not seen. The visualize segments appear patent. 



IVC:

Proximal inferior vena cava appears patent with the remainder 

obscured. 



OTHER FINDINGS:

None. 



IMPRESSION:

1. Limited examination due to body habitus. Splenomegaly is 

reiterated, without focal splenic mass associated. 



2. Cirrhotic liver pattern suggested though not as prominent as prior 

CT 09/30/2017. This is likely a function of differences in 

technology/body habitus. Normal directional blood flow at the main 

portal vein. Mildly inhomogeneous but nonfocal hepatic echotexture. 



3. Pancreas is completely obscured by overlying bowel gas. 



4. Unremarkable gallbladder with upper limits normal caliber CBD at 

6.5 mm.

## 2018-07-27 NOTE — CP.PCM.CON
<Holly Ruiz - Last Filed: 07/27/18 13:57>





History of Present Illness





- History of Present Illness


History of Present Illness: 


PGY5 Initial GI Consult





Michelle Payton is a 46yo male with PMHx significant for EtOH cirrhosis, recent 

TIA, HTN, HLD who presented to the ED to for lethary and weakness. According to 

the pt and EMR, he has had a long standing diag of liver cirrhosis, etiology 

likely Etoh. He states that he does not follow-up with GI. He notes that his 

PCP was trying to coordinate is diagnosis and tx. He reports going to 

[facility] in [place], [place] many years ago for work-up, but never 

followed up. Previous hospitalization post fall. He had a triple phase liver CT 

done at the time on 8/2017 and it did not reveal any sig. mass indicating HCC. 

Pt denies any abd pain. He notes that he occasionally sees blood on his toilet 

paper after wiping and straining. He has never had any endoscopy. States that 

his last drink was prior to admission. Denies any hematemesis, pruritus, 

jaundice, nausea, vomiting, weight loss.





PMHx: See HPI


PSHx: Appendectomy


FHx: Discussed with patient and denies any significant FHx


Social: Prior EtOH abuse (quit end of May following recent admission); Denies 

tobacco or illicit drug use


Endo: No prior endoscopic evaluations





12-point ROS conducted, neg other than previously stated above








Past Patient History





- Infectious Disease


Hx of Infectious Diseases: None





- Tetanus Immunizations


Tetanus Immunization: Unknown





- Past Medical History & Family History


Past Medical History?: Yes





- Past Social History


Smoking Status: Current Some Days Smoker


Alcohol: > 2 Drinks/Day


Drugs: Denies


Home Situation {Lives}: Homeless





- CARDIAC


Hx Cardiac Disorders: Yes


Hx Atrial Fibrillation: Yes


Hx Congestive Heart Failure: Yes


Hx Hypertension: Yes


Hx Pacemaker: No





- PULMONARY


Hx Respiratory Disorders: Yes


Hx Asthma: Yes


Hx Chronic Obstructive Pulmonary Disease (COPD): Yes (ASTHMA)





- NEUROLOGICAL


Hx Neurological Disorder: No


Hx Dizziness: Yes


Hx Seizures: No





- HEENT


Hx HEENT Problems: No





- RENAL


Hx Chronic Kidney Disease: No





- ENDOCRINE/METABOLIC


Hx Endocrine Disorders: No





- HEMATOLOGICAL/ONCOLOGICAL


Hx Blood Disorders: No


Hx AIDS: No


Hx Human Immunodeficiency Virus (HIV): No





- INTEGUMENTARY


Hx Dermatological Problems: No





- MUSCULOSKELETAL/RHEUMATOLOGICAL


Hx Musculoskeletal Disorders: Yes


Hx Arthritis: Yes


Hx Falls: Yes





- GASTROINTESTINAL


Hx Gastrointestinal Disorders: Yes


Hx Gall Bladder Disease: Yes





- GENITOURINARY/GYNECOLOGICAL


Hx Genitourinary Disorders: No


Hx Sexually Transmitted Disorders: No





- PSYCHIATRIC


Hx Psychophysiologic Disorder: Yes


Hx Anxiety: Yes


Hx Bipolar Disorder: Yes


Hx Depression: Yes


Hx Schizophrenia: Yes


Hx Substance Use: No





- SURGICAL HISTORY


Hx Surgeries: Yes


Hx Appendectomy: Yes


Hx Cholecystectomy: Yes


Hx Coronary Artery Bypass Graft: No


Hx Coronary Stent: No


Hx Tonsillectomy: No





- ANESTHESIA


Hx Anesthesia: Yes


Hx Anesthesia Reactions: No


Hx Malignant Hyperthermia: No


Has any member of the family had a problem w/ anesthesia?: No





Meds


Allergies/Adverse Reactions: 


 Allergies











Allergy/AdvReac Type Severity Reaction Status Date / Time


 


No Known Allergies Allergy   Verified 07/26/18 07:10














- Medications


Medications: 


 Current Medications





Albuterol (Ventolin Hfa 90 Mcg/Actuation (8 G))  2 puff IH Q6 PRN


   PRN Reason: Shortness of Breath


Aspirin (Ecotrin)  81 mg PO DAILY [facility]


   Last Admin: 07/27/18 08:56 Dose:  81 mg


Cilostazol (Pletal)  100 mg PO Q12 [facility]


   Last Admin: 07/27/18 08:56 Dose:  100 mg


Famotidine (Pepcid)  40 mg PO DAILY [facility]


   Last Admin: 07/27/18 08:56 Dose:  40 mg


Folic Acid (Folic Acid)  1 mg PO DAILY [facility]


   Last Admin: 07/27/18 08:56 Dose:  1 mg


Furosemide (Lasix)  20 mg PO BID [facility]


   Last Admin: 07/27/18 08:57 Dose:  20 mg


Lactulose (Enulose)  20 gm PO QID [facility]


   Last Admin: 07/27/18 08:57 Dose:  20 gm


Lorazepam (Ativan)  2 mg IVP Q6 PRN


   PRN Reason: Agitation


Nitroglycerin (Nitrostat Sl Tab)  0.4 mg SL Q5MIN PRN


   PRN Reason: chest pain


Ondansetron HCl (Zofran Inj)  4 mg IVP Q6 PRN


   PRN Reason: Nausea/Vomiting


Potassium Chloride (K-Dur 20 Meq Er Tab)  20 meq PO QID [facility]


   Stop: 07/28/18 09:01


Rifaximin (Xifaxan)  550 mg PO Q12 [facility]


   PRN Reason: Protocol


   Last Admin: 07/27/18 08:55 Dose:  550 mg











Physical Exam





- Constitutional


Appears: Non-toxic, No Acute Distress





- Head Exam


Head Exam: ATRAUMATIC, NORMOCEPHALIC





- Eye Exam


Eye Exam: Normal appearance





- ENT Exam


ENT Exam: Mucous Membranes Moist, Normal Exam





- Neck Exam


Neck exam: Positive for: Normal Inspection





- Respiratory Exam


Respiratory Exam: Clear to Auscultation Bilateral, NORMAL BREATHING PATTERN.  

absent: Prolonged Expiratory Phase, Rales, Rhonchi, Wheezes, Respiratory 

Distress





- Cardiovascular Exam


Cardiovascular Exam: REGULAR RHYTHM, +S1, +S2





- GI/Abdominal Exam


GI & Abdominal Exam: Normal Bowel Sounds, Soft.  absent: Distended, Firm, 

Guarding, Organomegaly, Rebound, Rigid





- Extremities Exam


Extremities exam: Negative for: joint swelling, pedal edema





- Neurological Exam


Neurological exam: Altered, Oriented x3





- Skin


Skin Exam: Dry, Intact, Normal Color, Warm





Results





- Vital Signs


Recent Vital Signs: 


 Last Vital Signs











Temp  98.1 F   07/27/18 12:02


 


Pulse  87   07/27/18 12:02


 


Resp  18   07/27/18 12:02


 


BP  112/66   07/27/18 12:02


 


Pulse Ox  100   07/27/18 12:02














- Labs


Result Diagrams: 


 07/27/18 05:09





 07/27/18 05:09


Labs: 


 Laboratory Results - last 24 hr











  07/26/18 07/26/18 07/26/18





  14:00 17:30 18:55


 


WBC   


 


RBC   


 


Hgb   


 


Hct   


 


MCV   


 


MCH   


 


MCHC   


 


RDW   


 


Plt Count   


 


MPV   


 


Neut % (Auto)   


 


Lymph % (Auto)   


 


Mono % (Auto)   


 


Eos % (Auto)   


 


Baso % (Auto)   


 


Neut # (Auto)   


 


Lymph # (Auto)   


 


Mono # (Auto)   


 


Eos # (Auto)   


 


Baso # (Auto)   


 


PT    17.8 H


 


INR    1.6 H


 


APTT   


 


Sodium   


 


Potassium   


 


Chloride   


 


Carbon Dioxide   


 


Anion Gap   


 


BUN   


 


Creatinine   


 


Est GFR ( Amer)   


 


Est GFR (Non-Af Amer)   


 


Random Glucose   


 


Calcium   


 


Phosphorus   


 


Magnesium  1.4 L  


 


Total Bilirubin   


 


AST   


 


ALT   


 


Alkaline Phosphatase   


 


Ammonia   


 


Total Protein   


 


Albumin   


 


Globulin   


 


Albumin/Globulin Ratio   


 


Urine Opiates Screen   


 


Urine Methadone Screen   


 


Ur Barbiturates Screen   


 


Ur Phencyclidine Scrn   


 


Ur Amphetamines Screen   


 


U Benzodiazepines Scrn   


 


U Oth Cocaine Metabols   


 


U Cannabinoids Screen   


 


Hepatitis A IgM Ab   Negative 


 


Hep Bs Antigen   Negative 


 


Hep B Core IgM Ab   Negative 


 


Hepatitis C Antibody   Negative 














  07/26/18 07/27/18 07/27/18





  22:46 05:09 05:09


 


WBC   3.1 L 


 


RBC   3.82 L 


 


Hgb   11.6 L 


 


Hct   34.2 L 


 


MCV   89.4 


 


MCH   30.3 


 


MCHC   33.9 


 


RDW   20.5 H 


 


Plt Count   102 L 


 


MPV   7.8 


 


Neut % (Auto)   50.6 


 


Lymph % (Auto)   27.5 


 


Mono % (Auto)   18.5 H 


 


Eos % (Auto)   3.0 


 


Baso % (Auto)   0.4 


 


Neut # (Auto)   1.6 L 


 


Lymph # (Auto)   0.8 L 


 


Mono # (Auto)   0.6 


 


Eos # (Auto)   0.1 


 


Baso # (Auto)   0.0 


 


PT   


 


INR   


 


APTT   


 


Sodium    134


 


Potassium    2.3 L*


 


Chloride    96 L


 


Carbon Dioxide    28


 


Anion Gap    12


 


BUN    6 L


 


Creatinine    0.6 L


 


Est GFR ( Amer)    > 60


 


Est GFR (Non-Af Amer)    > 60


 


Random Glucose    109


 


Calcium    7.8 L


 


Phosphorus    2.0 L


 


Magnesium    1.6


 


Total Bilirubin    3.4 H


 


AST    83 H


 


ALT    29


 


Alkaline Phosphatase    168 H


 


Ammonia   


 


Total Protein    8.4 H


 


Albumin    2.9 L


 


Globulin    5.4 H


 


Albumin/Globulin Ratio    0.5 L


 


Urine Opiates Screen  Negative  


 


Urine Methadone Screen  Negative  


 


Ur Barbiturates Screen  Negative  


 


Ur Phencyclidine Scrn  Negative  


 


Ur Amphetamines Screen  Negative  


 


U Benzodiazepines Scrn  Negative  


 


U Oth Cocaine Metabols  Negative  


 


U Cannabinoids Screen  Negative  


 


Hepatitis A IgM Ab   


 


Hep Bs Antigen   


 


Hep B Core IgM Ab   


 


Hepatitis C Antibody   














  07/27/18 07/27/18





  05:09 06:30


 


WBC  


 


RBC  


 


Hgb  


 


Hct  


 


MCV  


 


MCH  


 


MCHC  


 


RDW  


 


Plt Count  


 


MPV  


 


Neut % (Auto)  


 


Lymph % (Auto)  


 


Mono % (Auto)  


 


Eos % (Auto)  


 


Baso % (Auto)  


 


Neut # (Auto)  


 


Lymph # (Auto)  


 


Mono # (Auto)  


 


Eos # (Auto)  


 


Baso # (Auto)  


 


PT  19.4 H 


 


INR  1.7 H 


 


APTT  33.7 


 


Sodium  


 


Potassium  


 


Chloride  


 


Carbon Dioxide  


 


Anion Gap  


 


BUN  


 


Creatinine  


 


Est GFR ( Amer)  


 


Est GFR (Non-Af Amer)  


 


Random Glucose  


 


Calcium  


 


Phosphorus  


 


Magnesium  


 


Total Bilirubin  


 


AST  


 


ALT  


 


Alkaline Phosphatase  


 


Ammonia   167 H*


 


Total Protein  


 


Albumin  


 


Globulin  


 


Albumin/Globulin Ratio  


 


Urine Opiates Screen  


 


Urine Methadone Screen  


 


Ur Barbiturates Screen  


 


Ur Phencyclidine Scrn  


 


Ur Amphetamines Screen  


 


U Benzodiazepines Scrn  


 


U Oth Cocaine Metabols  


 


U Cannabinoids Screen  


 


Hepatitis A IgM Ab  


 


Hep Bs Antigen  


 


Hep B Core IgM Ab  


 


Hepatitis C Antibody  














Assessment & Plan





- Assessment and Plan (Free Text)


Assessment: 


Kimberly Payton is a 47M w/ hx of Cirrohsis 2/2 Etoh who presented to the ED 

for AMS and weakness





Hepatic encephalopathy


ETOH cirrhosis, admission MELD 27


HTN


Hyperlipidemia


Morbid obesity


hx of TIA








Plan: 


-unreliable hx of etoh use 


-pt will eventually need a colonoscopy and endoscopy as an oupt 


-recommend low salt diet


-could not appreciate sig ascities on abd exam, no indication for abd 

paracentesis at this time


-explained the importance of Etoh abstinence with video interp, pt acknowledged 

understanding


-lasix 40mg daily, aldactone 100mg daily upon discharge


-abd u/s did not reveal and hepatic mass, + cirrhotic changes 


-continue lactulose BID to QID, until at least 2-3 BM daily 


-continue xifaxan





D/W Dr. Irene








<Pastor Esqueda - Last Filed: 08/13/18 11:28>





Results





- Vital Signs


Recent Vital Signs: 


 Last Vital Signs











Temp  98.2 F   07/30/18 12:36


 


Pulse  99 H  07/30/18 12:36


 


Resp  20   07/30/18 12:36


 


BP  114/69   07/30/18 12:36


 


Pulse Ox  100   07/30/18 12:36














- Labs


Result Diagrams: 


 07/30/18 04:20





 07/30/18 04:20





Assessment & Plan





- Assessment and Plan (Free Text)


Assessment: 





patient seen and examined and agree with above





Pastor Esqueda MD, PhD

## 2018-07-27 NOTE — CP.PCM.HP
History of Present Illness





- History of Present Illness


History of Present Illness: 


H&P from 7/26/18


HPI: 47 YO Male with PMHx of cirrhosis, HTN, HLD, PVD, anemia and chronic ETOH 

abuse presents to [facility] ED for weakness and generalized malaise. Pt states that 

for the past few days he has been feeling very tired, and weak. His symptoms 

worsened in the past few days with nausea and multiple bouts of emesis (NBNB). 

Denies chest pain, dyspnea, palpitations, abdominal pain, chills and fever.    





PMHx: cirrhosis, HTN, HLD, PVD, afib in the past, anemia and chronic ETOH abuse


PSHx: appendectomy


Allergies: NKDA


Social: hx of smoking for years, ETOH drinks very often and about 7/8 beers 

at a time, last drink was about 3 days ago per pt. Denies illicit drug use 


Of note hx obtained from chart review and pt.


Indemand used for translation, 1369





Ammonia 189 and ETOH 105 in ED 








Present on Admission





- Present on Admission


Any Indicators Present on Admission: No





Review of Systems





- Constitutional


Constitutional: Malaise.  absent: Chills, Fever





- Cardiovascular


Cardiovascular: absent: Chest Pain, Dyspnea





- Respiratory


Respiratory: absent: Cough, Dyspnea





- Gastrointestinal


Gastrointestinal: Nausea, Vomiting.  absent: Abdominal Pain





- Genitourinary


Genitourinary: absent: Dysuria





Past Patient History





- Infectious Disease


Hx of Infectious Diseases: None





- Tetanus Immunizations


Tetanus Immunization: Unknown





- Past Medical History & Family History


Past Medical History?: Yes





- Past Social History


Smoking Status: Current Some Days Smoker


Alcohol: > 2 Drinks/Day


Drugs: Denies


Home Situation {Lives}: Homeless





- CARDIAC


Hx Cardiac Disorders: Yes


Hx Atrial Fibrillation: Yes


Hx Congestive Heart Failure: Yes


Hx Hypertension: Yes


Hx Pacemaker: No





- PULMONARY


Hx Respiratory Disorders: Yes


Hx Asthma: Yes


Hx Chronic Obstructive Pulmonary Disease (COPD): Yes (ASTHMA)





- NEUROLOGICAL


Hx Neurological Disorder: No


Hx Dizziness: Yes


Hx Seizures: No





- HEENT


Hx HEENT Problems: No





- RENAL


Hx Chronic Kidney Disease: No





- ENDOCRINE/METABOLIC


Hx Endocrine Disorders: No





- HEMATOLOGICAL/ONCOLOGICAL


Hx Blood Disorders: No


Hx AIDS: No


Hx Human Immunodeficiency Virus (HIV): No





- INTEGUMENTARY


Hx Dermatological Problems: No





- MUSCULOSKELETAL/RHEUMATOLOGICAL


Hx Musculoskeletal Disorders: Yes


Hx Arthritis: Yes


Hx Falls: Yes





- GASTROINTESTINAL


Hx Gastrointestinal Disorders: Yes


Hx Gall Bladder Disease: Yes





- GENITOURINARY/GYNECOLOGICAL


Hx Genitourinary Disorders: No


Hx Sexually Transmitted Disorders: No





- PSYCHIATRIC


Hx Psychophysiologic Disorder: Yes


Hx Anxiety: Yes


Hx Bipolar Disorder: Yes


Hx Depression: Yes


Hx Schizophrenia: Yes


Hx Substance Use: No





- SURGICAL HISTORY


Hx Surgeries: Yes


Hx Appendectomy: Yes


Hx Cholecystectomy: Yes


Hx Coronary Artery Bypass Graft: No


Hx Coronary Stent: No


Hx Tonsillectomy: No





- ANESTHESIA


Hx Anesthesia: Yes


Hx Anesthesia Reactions: No


Hx Malignant Hyperthermia: No


Has any member of the family had a problem w/ anesthesia?: No





Meds


Allergies/Adverse Reactions: 


 Allergies











Allergy/AdvReac Type Severity Reaction Status Date / Time


 


No Known Allergies Allergy   Verified 07/26/18 07:10














Physical Exam





- Constitutional


Appears: No Acute Distress, Other (sleepy and lethergic )





- Head Exam


Head Exam: NORMAL INSPECTION





- ENT Exam


ENT Exam: Mucous Membranes Moist





- Respiratory Exam


Respiratory Exam: NORMAL BREATHING PATTERN.  absent: Rales, Wheezes





- Cardiovascular Exam


Cardiovascular Exam: REGULAR RHYTHM, +S1, +S2





- GI/Abdominal Exam


GI & Abdominal Exam: Diminished Bowel Sounds, Distended, Soft.  absent: Guarding

, Rebound, Rigid


Additional comments: 





fluid wave positive 





- Extremities Exam


Additional comments: 





chronic dermatic changes with edema b/l in the lower extremities


+ asterixis





- Neurological Exam


Neurological exam: Alert


Additional comments: 





patient is lethargic and confused about location and month


oriented to name and year 





Results





- Vital Signs


Recent Vital Signs: 





 Last Vital Signs











Temp  98.8 F   07/27/18 05:08


 


Pulse  94 H  07/27/18 05:08


 


Resp  18   07/27/18 05:08


 


BP  128/73   07/27/18 05:08


 


Pulse Ox  100   07/27/18 05:08














- Labs


Result Diagrams: 


 07/27/18 05:09





 07/27/18 05:09


Labs: 





 Laboratory Results - last 24 hr











  07/26/18 07/26/18 07/26/18





  12:10 13:00 13:00


 


WBC   2.5 L 


 


RBC   4.22 L 


 


Hgb   12.9 


 


Hct   37.1 


 


MCV   87.8 


 


MCH   30.5 


 


MCHC   34.7 


 


RDW   20.4 H 


 


Plt Count   119 L D 


 


MPV   7.4 


 


Neut % (Auto)   54.5 


 


Lymph % (Auto)   27.1 


 


Mono % (Auto)   16.9 H 


 


Eos % (Auto)   1.0 


 


Baso % (Auto)   0.5 


 


Neut # (Auto)   1.4 L 


 


Lymph # (Auto)   0.7 L 


 


Mono # (Auto)   0.4 


 


Eos # (Auto)   0.0 


 


Baso # (Auto)   0.0 


 


PT   


 


INR   


 


Sodium    134


 


Potassium    2.3 L* D


 


Chloride    94 L


 


Carbon Dioxide    25


 


Anion Gap    17


 


BUN    5 L


 


Creatinine    0.6 L


 


Est GFR ( Amer)    > 60


 


Est GFR (Non-Af Amer)    > 60


 


POC Glucose (mg/dL)  104  


 


Random Glucose    103


 


Calcium    8.1 L


 


Phosphorus   


 


Magnesium   


 


Total Bilirubin    3.0 H


 


AST    91 H


 


ALT    30


 


Alkaline Phosphatase    185 H D


 


Ammonia   


 


Total Protein    9.1 H


 


Albumin    3.3 L


 


Globulin    5.8 H


 


Albumin/Globulin Ratio    0.6 L


 


Lipase    90


 


Urine Opiates Screen   


 


Urine Methadone Screen   


 


Ur Barbiturates Screen   


 


Ur Phencyclidine Scrn   


 


Ur Amphetamines Screen   


 


U Benzodiazepines Scrn   


 


U Oth Cocaine Metabols   


 


U Cannabinoids Screen   


 


Alcohol, Quantitative    105 H


 


Hepatitis A IgM Ab   


 


Hep Bs Antigen   


 


Hep B Core IgM Ab   


 


Hepatitis C Antibody   














  07/26/18 07/26/18 07/26/18





  13:00 14:00 17:30


 


WBC   


 


RBC   


 


Hgb   


 


Hct   


 


MCV   


 


MCH   


 


MCHC   


 


RDW   


 


Plt Count   


 


MPV   


 


Neut % (Auto)   


 


Lymph % (Auto)   


 


Mono % (Auto)   


 


Eos % (Auto)   


 


Baso % (Auto)   


 


Neut # (Auto)   


 


Lymph # (Auto)   


 


Mono # (Auto)   


 


Eos # (Auto)   


 


Baso # (Auto)   


 


PT   


 


INR   


 


Sodium   


 


Potassium   


 


Chloride   


 


Carbon Dioxide   


 


Anion Gap   


 


BUN   


 


Creatinine   


 


Est GFR ( Amer)   


 


Est GFR (Non-Af Amer)   


 


POC Glucose (mg/dL)   


 


Random Glucose   


 


Calcium   


 


Phosphorus   


 


Magnesium   1.4 L 


 


Total Bilirubin   


 


AST   


 


ALT   


 


Alkaline Phosphatase   


 


Ammonia  189 H* D  


 


Total Protein   


 


Albumin   


 


Globulin   


 


Albumin/Globulin Ratio   


 


Lipase   


 


Urine Opiates Screen   


 


Urine Methadone Screen   


 


Ur Barbiturates Screen   


 


Ur Phencyclidine Scrn   


 


Ur Amphetamines Screen   


 


U Benzodiazepines Scrn   


 


U Oth Cocaine Metabols   


 


U Cannabinoids Screen   


 


Alcohol, Quantitative   


 


Hepatitis A IgM Ab    Negative


 


Hep Bs Antigen    Negative


 


Hep B Core IgM Ab    Negative


 


Hepatitis C Antibody    Negative














  07/26/18 07/26/18 07/27/18





  18:55 22:46 05:09


 


WBC    3.1 L


 


RBC    3.82 L


 


Hgb    11.6 L


 


Hct    34.2 L


 


MCV    89.4


 


MCH    30.3


 


MCHC    33.9


 


RDW    20.5 H


 


Plt Count    102 L


 


MPV    7.8


 


Neut % (Auto)    50.6


 


Lymph % (Auto)    27.5


 


Mono % (Auto)    18.5 H


 


Eos % (Auto)    3.0


 


Baso % (Auto)    0.4


 


Neut # (Auto)    1.6 L


 


Lymph # (Auto)    0.8 L


 


Mono # (Auto)    0.6


 


Eos # (Auto)    0.1


 


Baso # (Auto)    0.0


 


PT  17.8 H  


 


INR  1.6 H  


 


Sodium   


 


Potassium   


 


Chloride   


 


Carbon Dioxide   


 


Anion Gap   


 


BUN   


 


Creatinine   


 


Est GFR ( Amer)   


 


Est GFR (Non-Af Amer)   


 


POC Glucose (mg/dL)   


 


Random Glucose   


 


Calcium   


 


Phosphorus   


 


Magnesium   


 


Total Bilirubin   


 


AST   


 


ALT   


 


Alkaline Phosphatase   


 


Ammonia   


 


Total Protein   


 


Albumin   


 


Globulin   


 


Albumin/Globulin Ratio   


 


Lipase   


 


Urine Opiates Screen   Negative 


 


Urine Methadone Screen   Negative 


 


Ur Barbiturates Screen   Negative 


 


Ur Phencyclidine Scrn   Negative 


 


Ur Amphetamines Screen   Negative 


 


U Benzodiazepines Scrn   Negative 


 


U Oth Cocaine Metabols   Negative 


 


U Cannabinoids Screen   Negative 


 


Alcohol, Quantitative   


 


Hepatitis A IgM Ab   


 


Hep Bs Antigen   


 


Hep B Core IgM Ab   


 


Hepatitis C Antibody   














  07/27/18





  05:09


 


WBC 


 


RBC 


 


Hgb 


 


Hct 


 


MCV 


 


MCH 


 


MCHC 


 


RDW 


 


Plt Count 


 


MPV 


 


Neut % (Auto) 


 


Lymph % (Auto) 


 


Mono % (Auto) 


 


Eos % (Auto) 


 


Baso % (Auto) 


 


Neut # (Auto) 


 


Lymph # (Auto) 


 


Mono # (Auto) 


 


Eos # (Auto) 


 


Baso # (Auto) 


 


PT 


 


INR 


 


Sodium  134


 


Potassium  2.3 L*


 


Chloride  96 L


 


Carbon Dioxide  28


 


Anion Gap  12


 


BUN  6 L


 


Creatinine  0.6 L


 


Est GFR ( Amer)  > 60


 


Est GFR (Non-Af Amer)  > 60


 


POC Glucose (mg/dL) 


 


Random Glucose  109


 


Calcium  7.8 L


 


Phosphorus  2.0 L


 


Magnesium  1.6


 


Total Bilirubin  3.4 H


 


AST  83 H


 


ALT  29


 


Alkaline Phosphatase  168 H


 


Ammonia 


 


Total Protein  8.4 H


 


Albumin  2.9 L


 


Globulin  5.4 H


 


Albumin/Globulin Ratio  0.5 L


 


Lipase 


 


Urine Opiates Screen 


 


Urine Methadone Screen 


 


Ur Barbiturates Screen 


 


Ur Phencyclidine Scrn 


 


Ur Amphetamines Screen 


 


U Benzodiazepines Scrn 


 


U Oth Cocaine Metabols 


 


U Cannabinoids Screen 


 


Alcohol, Quantitative 


 


Hepatitis A IgM Ab 


 


Hep Bs Antigen 


 


Hep B Core IgM Ab 


 


Hepatitis C Antibody 














Assessment & Plan


(1) Hepatic encephalopathy


Status: Acute   





(2) Hypokalemia


Status: Acute   





(3) Hypomagnesemia


Status: Resolved   





(4) Alcoholic cirrhosis of liver


Status: Chronic   





(5) Alcohol abuse with intoxication


Status: Acute   





(6) Hypertension


Status: Chronic   





(7) PVD (peripheral vascular disease)


Status: Chronic   





- Assessment and Plan (Free Text)


Assessment: 





Assessment/Plan:


47 YO Male with PMHx of cirrhosis, HTN, HLD, PVD, anemia and chronic ETOH abuse 

is admitted for hepatic encephalopathy, hypokalemia. 





-GI consulted; hep panel neg


-pending u/s abd  


-hypokalemia persists after 90meq of K; will replace as needed


-EKG; prolong QT with ST changes and PACs 


-repeat ekg this AM


-lactulose until 2-3 BM/day 


-MELD score 18, 6% 3 month mortality


-CIWA on admission 7; today 3


-CIWA protocol; with ativan prn 


-CT Head no intracranial abnormalities


-blood work reviewed, VS stable


-Plan as ordered





Pt seen and examined with Dr. Mcqueen

## 2018-07-28 LAB
ALBUMIN SERPL-MCNC: 3.2 G/DL (ref 3.5–5)
ALBUMIN/GLOB SERPL: 0.6 {RATIO} (ref 1–2.1)
ALT SERPL-CCNC: 28 U/L (ref 21–72)
ANISOCYTOSIS BLD QL SMEAR: SLIGHT
AST SERPL-CCNC: 92 U/L (ref 17–59)
BASOPHILS # BLD AUTO: 0 K/UL (ref 0–0.2)
BASOPHILS NFR BLD: 0.6 % (ref 0–2)
BASOPHILS NFR BLD: 1 % (ref 0–2)
BUN SERPL-MCNC: 6 MG/DL (ref 9–20)
CALCIUM SERPL-MCNC: 7.9 MG/DL (ref 8.4–10.2)
EOSINOPHIL # BLD AUTO: 0.2 K/UL (ref 0–0.7)
EOSINOPHIL NFR BLD: 4 % (ref 0–7)
EOSINOPHIL NFR BLD: 6 % (ref 0–4)
ERYTHROCYTE [DISTWIDTH] IN BLOOD BY AUTOMATED COUNT: 20.6 % (ref 11.5–14.5)
GFR NON-AFRICAN AMERICAN: > 60
HGB BLD-MCNC: 12.7 G/DL (ref 12–18)
LG PLATELETS BLD QL SMEAR: PRESENT
LYMPHOCYTE: 35 % (ref 20–50)
LYMPHOCYTES # BLD AUTO: 0.9 K/UL (ref 1–4.3)
LYMPHOCYTES NFR BLD AUTO: 35.8 % (ref 20–40)
MCH RBC QN AUTO: 30.6 PG (ref 27–31)
MCHC RBC AUTO-ENTMCNC: 33.5 G/DL (ref 33–37)
MCV RBC AUTO: 91.5 FL (ref 80–94)
MONOCYTE: 16 % (ref 0–10)
MONOCYTES # BLD: 0.5 K/UL (ref 0–0.8)
MONOCYTES NFR BLD: 20.3 % (ref 0–10)
NEUTROPHILS # BLD: 1 K/UL (ref 1.8–7)
NEUTROPHILS NFR BLD AUTO: 37.3 % (ref 50–75)
NEUTROPHILS NFR BLD AUTO: 44 % (ref 42–75)
NRBC BLD AUTO-RTO: 0.1 % (ref 0–0)
OVALOCYTES BLD QL SMEAR: SLIGHT
PLATELET # BLD EST: (no result) 10*3/UL
PLATELET # BLD: 103 K/UL (ref 130–400)
PMV BLD AUTO: 7.6 FL (ref 7.2–11.7)
POIKILOCYTOSIS BLD QL SMEAR: SLIGHT
RBC # BLD AUTO: 4.15 MIL/UL (ref 4.4–5.9)
TOTAL CELLS COUNTED BLD: 100
WBC # BLD AUTO: 2.6 K/UL (ref 4.8–10.8)

## 2018-07-28 RX ADMIN — POTASSIUM CHLORIDE SCH MEQ: 20 TABLET, EXTENDED RELEASE ORAL at 10:05

## 2018-07-28 RX ADMIN — CILOSTAZOL SCH MG: 100 TABLET ORAL at 10:04

## 2018-07-28 RX ADMIN — CILOSTAZOL SCH MG: 100 TABLET ORAL at 21:29

## 2018-07-28 NOTE — CARD
--------------- APPROVED REPORT --------------





Date of service: 07/27/2018



EKG Measurement

Heart [id number]

IA [id number]

[id number]

[id number]

[id number]



<Conclusion>

Normal sinus rhythm

Possible Left atrial enlargement

Nonspecific ST and T wave abnormality

Prolonged QT

Abnormal ECG

## 2018-07-28 NOTE — PN
DATE:  07/28/2018



SUBJECTIVE:  The patient is seen and examined.  The patient seen for Dr. Mcqueen while he is away.  The patient is awake, responsive, and feels okay.

Denies any chest pain, shortness of breath, dizziness, or palpitation.

 

PHYSICAL EXAMINATION:

GENERAL:  The patient is in no acute distress.

VITAL SIGNS:  Stable.

HEART:  S1 and S2, normal and regular.

LUNGS:  Good bilateral air exchange.

ABDOMEN:  Soft and nontender.

EXTREMITIES:  No edema.  No calf swelling.  No tenderness.  No acute

ischemia.

CNS:  Exam is essentially unchanged.



DIAGNOSTIC DATA:  Available diagnostic data reviewed _____.



ASSESSMENT AND PLAN:  Overall, the patient's general medical condition

_____.







__________________________________________

Gilson Rosas MD



DD:  07/28/2018 8:26:40

DT:  07/28/2018 9:55:29

Job # [id number]

## 2018-07-29 LAB
ALBUMIN SERPL-MCNC: 2.9 G/DL (ref 3.5–5)
ALBUMIN/GLOB SERPL: 0.5 {RATIO} (ref 1–2.1)
ALT SERPL-CCNC: 21 U/L (ref 21–72)
AST SERPL-CCNC: 77 U/L (ref 17–59)
BUN SERPL-MCNC: 6 MG/DL (ref 9–20)
CALCIUM SERPL-MCNC: 7.4 MG/DL (ref 8.4–10.2)
ERYTHROCYTE [DISTWIDTH] IN BLOOD BY AUTOMATED COUNT: 20.3 % (ref 11.5–14.5)
GFR NON-AFRICAN AMERICAN: > 60
HGB BLD-MCNC: 12 G/DL (ref 12–18)
MCH RBC QN AUTO: 30.9 PG (ref 27–31)
MCHC RBC AUTO-ENTMCNC: 33.9 G/DL (ref 33–37)
MCV RBC AUTO: 91.1 FL (ref 80–94)
PLATELET # BLD: 95 K/UL (ref 130–400)
RBC # BLD AUTO: 3.87 MIL/UL (ref 4.4–5.9)
WBC # BLD AUTO: 3 K/UL (ref 4.8–10.8)

## 2018-07-29 RX ADMIN — POTASSIUM CHLORIDE SCH MEQ: 20 TABLET, EXTENDED RELEASE ORAL at 09:02

## 2018-07-29 RX ADMIN — POTASSIUM CHLORIDE SCH MLS/HR: 14.9 INJECTION, SOLUTION INTRAVENOUS at 10:10

## 2018-07-29 RX ADMIN — POTASSIUM CHLORIDE SCH MLS/HR: 14.9 INJECTION, SOLUTION INTRAVENOUS at 11:10

## 2018-07-29 RX ADMIN — CILOSTAZOL SCH MG: 100 TABLET ORAL at 09:05

## 2018-07-29 RX ADMIN — CILOSTAZOL SCH MG: 100 TABLET ORAL at 22:31

## 2018-07-29 RX ADMIN — POTASSIUM CHLORIDE SCH MLS/HR: 14.9 INJECTION, SOLUTION INTRAVENOUS at 12:10

## 2018-07-29 RX ADMIN — POTASSIUM CHLORIDE SCH MLS/HR: 14.9 INJECTION, SOLUTION INTRAVENOUS at 09:06

## 2018-07-29 NOTE — PN
DATE:  07/29/2018



SUBJECTIVE:  The patient seen and examined.  Interim events noted.  The

patient remains in progressive care unit on telemetry monitoring.  The

patient feels okay.  Denies any chest pain or shortness of breath. 

Abdominal pain is controlled.



PHYSICAL EXAMINATION:

GENERAL:  The patient is in no acute distress.

VITAL SIGNS:  Stable.

HEART:  S1 and S2.  Normal and regular.

LUNGS:  Good bilateral air exchange.

ABDOMEN:  Distended but nonacute.  No guarding.  No rigidity.  No rebound. 

Bowel sounds are plus and normal.  There is no organomegaly.

EXTREMITIES:  No edema.  No calf swelling.  No tenderness.  No acute

ischemia.

CNS:  Exam is essentially unchanged.



DIAGNOSTIC DATA:  Available diagnostic data reviewed.  Ammonia level is

123.



ASSESSMENT AND PLAN:  Overall, the patient's general medical condition is

stable and improving.  Plan as ordered.







__________________________________________

Gilson Rosas MD



DD:  07/29/2018 7:26:58

DT:  07/29/2018 7:28:15

Job # [id number]

## 2018-07-30 VITALS
SYSTOLIC BLOOD PRESSURE: 114 MMHG | DIASTOLIC BLOOD PRESSURE: 69 MMHG | HEART RATE: 99 BPM | RESPIRATION RATE: 20 BRPM | OXYGEN SATURATION: 100 % | TEMPERATURE: 98.2 F

## 2018-07-30 LAB
ALBUMIN SERPL-MCNC: 2.8 G/DL (ref 3.5–5)
ALBUMIN/GLOB SERPL: 0.5 {RATIO} (ref 1–2.1)
ALT SERPL-CCNC: 30 U/L (ref 21–72)
AST SERPL-CCNC: 78 U/L (ref 17–59)
BUN SERPL-MCNC: 9 MG/DL (ref 9–20)
CALCIUM SERPL-MCNC: 7.9 MG/DL (ref 8.4–10.2)
ERYTHROCYTE [DISTWIDTH] IN BLOOD BY AUTOMATED COUNT: 20.6 % (ref 11.5–14.5)
GFR NON-AFRICAN AMERICAN: > 60
HGB BLD-MCNC: 11.5 G/DL (ref 12–18)
MCH RBC QN AUTO: 31.2 PG (ref 27–31)
MCHC RBC AUTO-ENTMCNC: 34.2 G/DL (ref 33–37)
MCV RBC AUTO: 91.3 FL (ref 80–94)
PLATELET # BLD: 89 K/UL (ref 130–400)
RBC # BLD AUTO: 3.69 MIL/UL (ref 4.4–5.9)
WBC # BLD AUTO: 3.8 K/UL (ref 4.8–10.8)

## 2018-07-30 RX ADMIN — POTASSIUM CHLORIDE SCH MEQ: 20 TABLET, EXTENDED RELEASE ORAL at 09:00

## 2018-07-30 RX ADMIN — CILOSTAZOL SCH MG: 100 TABLET ORAL at 09:02

## 2018-07-30 NOTE — CP.PCM.PN
Subjective





- Date & Time of Evaluation


Date of Evaluation: 07/30/18


Time of Evaluation: 12:16





- Subjective


Subjective: 





no overnight events





Objective





- Vital Signs/Intake and Output


Vital Signs (last 24 hours): 


 











Temp Pulse Resp BP Pulse Ox


 


 98.1 F   89   18   125/84   98 


 


 07/30/18 04:00  07/30/18 04:00  07/30/18 04:00  07/30/18 09:01  07/30/18 04:00











- Medications


Medications: 


 Current Medications





Albuterol (Ventolin Hfa 90 Mcg/Actuation (8 G))  2 puff IH Q6 PRN


   PRN Reason: Shortness of Breath


Aspirin (Ecotrin)  81 mg PO DAILY [facility]


   Last Admin: 07/30/18 08:59 Dose:  81 mg


Cilostazol (Pletal)  100 mg PO Q12 [facility]


   Last Admin: 07/30/18 09:02 Dose:  100 mg


Famotidine (Pepcid)  40 mg PO DAILY [facility]


   Last Admin: 07/30/18 09:01 Dose:  40 mg


Folic Acid (Folic Acid)  1 mg PO DAILY [facility]


   Last Admin: 07/30/18 09:00 Dose:  1 mg


Furosemide (Lasix)  20 mg PO BID [facility]


   Last Admin: 07/30/18 09:01 Dose:  20 mg


Lactulose (Enulose)  20 gm PO QID [facility]


   Last Admin: 07/30/18 09:00 Dose:  20 gm


Lorazepam (Ativan)  2 mg IVP Q6 PRN


   PRN Reason: Agitation


Nitroglycerin (Nitrostat Sl Tab)  0.4 mg SL Q5MIN PRN


   PRN Reason: chest pain


Ondansetron HCl (Zofran Inj)  4 mg IVP Q6 PRN


   PRN Reason: Nausea/Vomiting


Potassium Chloride (K-Dur 20 Meq Er Tab)  60 meq PO DAILY [facility]


   Last Admin: 07/30/18 09:00 Dose:  60 meq


Rifaximin (Xifaxan)  550 mg PO Q12 [facility]


   PRN Reason: Protocol


   Last Admin: 07/30/18 09:02 Dose:  550 mg











- Labs


Labs: 


 





 07/30/18 04:20 





 07/30/18 04:20 





 











PT  19.4 Seconds (9.8-13.1)  H  07/27/18  05:09    


 


INR  1.7  (0.9-1.2)  H  07/27/18  05:09    


 


APTT  33.7 Seconds (25.6-37.1)   07/27/18  05:09    














- Head Exam


Head Exam: NORMAL INSPECTION, NORMOCEPHALIC





- Neck Exam


Neck Exam: Normal Inspection





- Cardiovascular Exam


Cardiovascular Exam: REGULAR RHYTHM





- GI/Abdominal Exam


GI & Abdominal Exam: Soft, Normal Bowel Sounds





Assessment and Plan





- Assessment and Plan (Free Text)


Assessment: 





49 yo male with EtOH





doing well


no new GI issues


dc planning when able

## 2018-10-10 ENCOUNTER — HOSPITAL ENCOUNTER (EMERGENCY)
Dept: HOSPITAL 31 - C.ER | Age: 48
LOS: 1 days | Discharge: HOME | End: 2018-10-11
Payer: MEDICARE

## 2018-10-10 VITALS — BODY MASS INDEX: 40.6 KG/M2

## 2018-10-10 DIAGNOSIS — F10.10: Primary | ICD-10-CM

## 2018-10-11 VITALS
SYSTOLIC BLOOD PRESSURE: 126 MMHG | TEMPERATURE: 97.8 F | RESPIRATION RATE: 20 BRPM | DIASTOLIC BLOOD PRESSURE: 70 MMHG | HEART RATE: 90 BPM

## 2018-10-11 VITALS — OXYGEN SATURATION: 96 %

## 2018-10-12 ENCOUNTER — HOSPITAL ENCOUNTER (EMERGENCY)
Dept: HOSPITAL 14 - H.ER | Age: 48
Discharge: HOME | End: 2018-10-12
Payer: MEDICARE

## 2018-10-12 VITALS — RESPIRATION RATE: 14 BRPM | OXYGEN SATURATION: 99 % | TEMPERATURE: 97.7 F

## 2018-10-12 VITALS — BODY MASS INDEX: 40.6 KG/M2

## 2018-10-12 VITALS — DIASTOLIC BLOOD PRESSURE: 91 MMHG | SYSTOLIC BLOOD PRESSURE: 150 MMHG | HEART RATE: 89 BPM

## 2018-10-12 DIAGNOSIS — F10.129: Primary | ICD-10-CM

## 2018-10-12 PROCEDURE — 99282 EMERGENCY DEPT VISIT SF MDM: CPT

## 2018-10-12 PROCEDURE — 82948 REAGENT STRIP/BLOOD GLUCOSE: CPT

## 2018-10-12 NOTE — ED PDOC
HPI: Psych/Substance Abuse


Time Seen by Provider: 10/12/18 00:15


Chief Complaint (Nursing): Alcohol Ingestion


Chief Complaint (Provider): alcohol ingestion


History Per: Patient


History/Exam Limitations: no limitations


Onset/Duration Of Symptoms: Hrs (today)


Current Symptoms Are (Timing): Still Present


Additional Complaint(s): 


Kibmerly Payton is a 48 year old male, with a past medical history of HTN, who 

was brought to the emergency department by [facility] for alcohol 

intoxication. Patient was found sleeping in the street and admits to drinking 

beer earlier tonight. Patient is well known to ED staff for history of alcohol 

and drug abuse. Patient states he experienced a brief episode of lower back pain

this morning but has no complaints at present. Patient is requesting a place to 

sleep for the night. He denies any falls/trauma, nausea, vomit, diarrhea, 

headache, chest pain, SOB, numbness/weakness, fever, or chills. 





PMD: Irlanda Parra 





Past Medical History


Reviewed: Historical Data, Nursing Documentation, Vital Signs


Vital Signs: 





                                Last Vital Signs











Temp  97.7 F   10/12/18 00:06


 


Pulse  57 L  10/12/18 00:06


 


Resp  14   10/12/18 00:06


 


BP  139/81   10/12/18 00:06


 


Pulse Ox  99   10/12/18 00:06














- Medical History


PMH: Anxiety, Arthritis, Asthma, Atrial Fibrillation, Bipolar Disorder, CHF, 

COPD (ASTHMA), Depression, Gall Bladder Disease, HTN, Schizophrenia





- Surgical History


Surgical History: Appendectomy, Cholecystectomy





- Family History


Family History: States: Unknown Family Hx





- Social History


Current smoker - smoking cessation education provided: Yes (Current some days 

smoker)


Alcohol: > 2 Drinks/Day





- Home Medications


Home Medications: 


                                Ambulatory Orders











 Medication  Instructions  Recorded


 


Folic Acid 1 mg PO DAILY #0 tab 03/25/16


 


Fluticasone Nasal [Flonase] 2 spray NS DAILY PRN 01/08/18


 


Furosemide [Lasix] 20 mg PO BID #30 tab 01/11/18


 


Gabapentin [Neurontin] 400 mg PO TID 07/12/18


 


Albuterol Sulfate [Ventolin Hfa] 2 puff IH Q6 PRN 07/26/18


 


Aspirin [Ecotrin] 81 mg PO DAILY 07/26/18


 


Cilostazol [Pletal] 100 mg PO Q12 07/26/18


 


Famotidine [Pepcid] 40 mg PO DAILY 07/26/18


 


Memantine [Namenda] 10 mg PO DAILY 07/26/18


 


rifAXIMin [Xifaxan] 550 mg PO Q12 07/26/18


 


Lactulose [Enulose] 20 gm PO QID #60 udc 07/30/18


 


Potassium Chloride [K-Tab ER] 20 meq PO DAILY #7 tablet.er 07/30/18














- Allergies


Allergies/Adverse Reactions: 


                                    Allergies











Allergy/AdvReac Type Severity Reaction Status Date / Time


 


No Known Allergies Allergy   Verified 10/10/18 20:57














Review of Systems


ROS Statement: Except As Marked, All Systems Reviewed And Found Negative


Constitutional: Negative for: Fever, Chills


Gastrointestinal: Negative for: Nausea, Vomiting, Diarrhea





Physical Exam





- Reviewed


Nursing Documentation Reviewed: Yes


Vital Signs Reviewed: Yes





- Physical Exam


Comments: 





GENERAL APPEARANCE: Patient is awake, alert, oriented x 3, in no acute distress.

Resting comfortable. Odor of alcohol on breath


SKIN: Warm, dry; (-) cyanosis


HEAD: (-) scalp swelling, (-) scalp tenderness.


EYES: (+) bilateral conjunctival injection (-) conjunctival pallor, (-) scleral 

icterus, (-) nystagmus.


ENMT: Mucous membranes moist. Airway patent: (-) stridor.


NECK: (-) tenderness, (-) stiffness, (-) lymphadenopathy.


HEART AND CARDIOVASCULAR: (-) irregularity; (-) murmur, (-) gallop.


CHEST AND RESPIRATORY: (-) rales, (-) rhonchi, (-) wheezes; breath sounds equal.


ABDOMEN: Soft, (-) distention, (-) tenderness, (-) guarding.


NEURO AND PSYCH: Mental status as above. (+) Mild slurred speech. 


CNs: Intact. Pupils equal and reactive; EOMI; (-) facial asymmetry; tongue and 

uvula midline. Strength and DTRs symmetric. 





- ECG


O2 Sat by Pulse Oximetry: 99 (RA)


Pulse Ox Interpretation: Normal





Medical Decision Making


Medical Decision Making: 


Time: 00:15


Initial Impression: Alcohol abuse and intoxication


Initial Plan:


--Alcohol serum


--Reevaluation





0105


Serum Alcohol: 272


Accucheck: 84





0200


Patient sleeping comfortably in ED. No acute distress noted.





0515


Patient awake, alert, oriented x3. Patient with steady gait in ED without 

assistance. Patient offers no complaints at this time. 


Lungs clear to auscultation, cardiac RRR, abdomen soft, non-tender, repeat neuro

exam shows no focal findings. Vitals stable.


Lab /Diagnostic results d/w the patient in great detail. Diagnosis of alcohol 

abuse with intoxication d/w the patient. 


Based on history, exam and diagnostic results, plan will be for outpatient 

follow up. 


Patient was observed in ED for 5+ hours with no evidence of neurological 

deterioration.


Patient instructed to follow-up with pmd / referral provided / the clinic  in 1-

2 days without fail. Return to the emergency room at any time for any new or 

worsening symptoms. Patient states he fully agrees with and understands 

discharge instructions. States that he agrees with the plan and disposition. 

Verbalized and repeated discharge instructions and plan. I have given the 

patient opportunity to ask any additional questions.


 

--------------------------------------------------------------------------------


----


Scribe Attestation:


Documented by Andres Arellano, acting as a scribe for Betty Vargas PA-C.





Provider Scribe Attestation:


All medical record entries made by the Scribe were at my direction and 

personally dictated by me. I have reviewed the chart and agree that the record 

accurately reflects my personal performance of the history, physical exam, 

medical decision making, and the department course for this patient. I have also

personally directed, reviewed, and agree with the discharge instructions and 

disposition.





Disposition





- Clinical Impression


Clinical Impression: 


 Alcohol abuse with intoxication








- Patient ED Disposition


Is Patient to be Admitted: No


Counseled Patient/Family Regarding: Studies Performed, Diagnosis, Need For 

Followup





- Disposition


Referrals: 


[facility] [Outside]


Disposition: Routine/Home


Disposition Time: 05:15


Condition: FAIR


Additional Instructions: 





La atencin mdica de emergencia que recibi hoy se dirigi a kalyn sntomas 

agudos. Si le recetaron algn medicamento, llnelo y tmelo segn las 

indicaciones. Los sntomas pueden tardar varios salazar en resolverse. Regrese al 

Departamento de Emergencias si kalyn sntomas empeoran, no mejoran o si tiene 

otros problemas.





Comunquese con salcedo mdico dentro de 2 salazar para etssa nueva evaluacin y sameer un 

seguimiento o llame a aye de los mdicos / clnicas a los que ha sido referido y

 que figuran en el formulario de Informacin de visita al paciente que se 

incluye en salcedo paquete de deny. Lleve con usted a salcedo consulta de seguimiento toda

 la documentacin que recibi del deny junto con los medicamentos que est 

tomando. Nuestro tratamiento no puede reemplazar la atencin mdica continua por

 parte de un proveedor de atencin primaria (PCP) fuera del departamento de 

emergencias.


Instructions:  Alcohol Abuse and Alcoholism (DC), Effects of Alcohol on Your 

Health


Forms:  [facility] (English)


Print Language: Lao





- POA


Present On Arrival: None





Results





- Lab Results


Lab Results: 

















  10/12/18





  01:05


 


Alcohol, Quantitative  272 H

## 2018-10-25 ENCOUNTER — HOSPITAL ENCOUNTER (OUTPATIENT)
Dept: HOSPITAL 31 - C.ER | Age: 48
Setting detail: OBSERVATION
LOS: 2 days | Discharge: HOME | End: 2018-10-27
Attending: INTERNAL MEDICINE | Admitting: INTERNAL MEDICINE
Payer: MEDICARE

## 2018-10-25 VITALS — RESPIRATION RATE: 20 BRPM

## 2018-10-25 VITALS — BODY MASS INDEX: 40.6 KG/M2

## 2018-10-25 DIAGNOSIS — E83.42: ICD-10-CM

## 2018-10-25 DIAGNOSIS — F31.9: ICD-10-CM

## 2018-10-25 DIAGNOSIS — E87.6: ICD-10-CM

## 2018-10-25 DIAGNOSIS — I27.20: ICD-10-CM

## 2018-10-25 DIAGNOSIS — F03.90: ICD-10-CM

## 2018-10-25 DIAGNOSIS — R56.9: ICD-10-CM

## 2018-10-25 DIAGNOSIS — K70.30: ICD-10-CM

## 2018-10-25 DIAGNOSIS — I11.0: Primary | ICD-10-CM

## 2018-10-25 DIAGNOSIS — E66.01: ICD-10-CM

## 2018-10-25 DIAGNOSIS — Z87.891: ICD-10-CM

## 2018-10-25 DIAGNOSIS — E78.5: ICD-10-CM

## 2018-10-25 DIAGNOSIS — I48.91: ICD-10-CM

## 2018-10-25 DIAGNOSIS — I50.32: ICD-10-CM

## 2018-10-25 LAB
ALBUMIN SERPL-MCNC: 3.7 G/DL (ref 3.5–5)
ALBUMIN/GLOB SERPL: 0.6 {RATIO} (ref 1–2.1)
ALT SERPL-CCNC: 23 U/L (ref 21–72)
APTT BLD: 35 SECONDS (ref 21–34)
APTT BLD: 37 SECONDS (ref 21–34)
AST SERPL-CCNC: 70 U/L (ref 17–59)
BASOPHILS # BLD AUTO: 0 K/UL (ref 0–0.2)
BASOPHILS NFR BLD: 1 % (ref 0–2)
BILIRUB UR-MCNC: NEGATIVE MG/DL
BUN SERPL-MCNC: 8 MG/DL (ref 9–20)
CALCIUM SERPL-MCNC: 9.3 MG/DL (ref 8.6–10.4)
EOSINOPHIL # BLD AUTO: 0.1 K/UL (ref 0–0.7)
EOSINOPHIL NFR BLD: 3.2 % (ref 0–4)
ERYTHROCYTE [DISTWIDTH] IN BLOOD BY AUTOMATED COUNT: 18.6 % (ref 11.5–14.5)
GFR NON-AFRICAN AMERICAN: > 60
GLUCOSE UR STRIP-MCNC: NORMAL MG/DL
HDLC SERPL-MCNC: 27 MG/DL (ref 30–70)
HGB BLD-MCNC: 13.2 G/DL (ref 12–18)
INR PPP: 1.5
INR PPP: 1.7
LDLC SERPL-MCNC: 53 MG/DL (ref 0–129)
LEUKOCYTE ESTERASE UR-ACNC: (no result) LEU/UL
LIPASE: 177 U/L (ref 23–300)
LYMPHOCYTES # BLD AUTO: 1 K/UL (ref 1–4.3)
LYMPHOCYTES NFR BLD AUTO: 32.6 % (ref 20–40)
MCH RBC QN AUTO: 31.2 PG (ref 27–31)
MCHC RBC AUTO-ENTMCNC: 34 G/DL (ref 33–37)
MCV RBC AUTO: 91.9 FL (ref 80–94)
MONOCYTES # BLD: 0.6 K/UL (ref 0–0.8)
MONOCYTES NFR BLD: 18.5 % (ref 0–10)
NEUTROPHILS # BLD: 1.4 K/UL (ref 1.8–7)
NEUTROPHILS NFR BLD AUTO: 44.7 % (ref 50–75)
NRBC BLD AUTO-RTO: 0.1 % (ref 0–2)
PH UR STRIP: 8 [PH] (ref 5–8)
PLATELET # BLD: 179 K/UL (ref 130–400)
PMV BLD AUTO: 7.9 FL (ref 7.2–11.7)
PROT UR STRIP-MCNC: NEGATIVE MG/DL
PROTHROMBIN TIME: 15.9 SECONDS (ref 9.7–12.2)
PROTHROMBIN TIME: 18.1 SECONDS (ref 9.7–12.2)
RBC # BLD AUTO: 4.23 MIL/UL (ref 4.4–5.9)
RBC # UR STRIP: NEGATIVE /UL
SP GR UR STRIP: 1.01 (ref 1–1.03)
SQUAMOUS EPITHIAL: 1 /HPF (ref 0–5)
UROBILINOGEN UR-MCNC: 4 MG/DL (ref 0.2–1)
WBC # BLD AUTO: 3.1 K/UL (ref 4.8–10.8)

## 2018-10-25 PROCEDURE — 85730 THROMBOPLASTIN TIME PARTIAL: CPT

## 2018-10-25 PROCEDURE — 80061 LIPID PANEL: CPT

## 2018-10-25 PROCEDURE — 84484 ASSAY OF TROPONIN QUANT: CPT

## 2018-10-25 PROCEDURE — 87086 URINE CULTURE/COLONY COUNT: CPT

## 2018-10-25 PROCEDURE — 71045 X-RAY EXAM CHEST 1 VIEW: CPT

## 2018-10-25 PROCEDURE — 96361 HYDRATE IV INFUSION ADD-ON: CPT

## 2018-10-25 PROCEDURE — 96375 TX/PRO/DX INJ NEW DRUG ADDON: CPT

## 2018-10-25 PROCEDURE — 96372 THER/PROPH/DIAG INJ SC/IM: CPT

## 2018-10-25 PROCEDURE — 85025 COMPLETE CBC W/AUTO DIFF WBC: CPT

## 2018-10-25 PROCEDURE — 93005 ELECTROCARDIOGRAM TRACING: CPT

## 2018-10-25 PROCEDURE — 86900 BLOOD TYPING SEROLOGIC ABO: CPT

## 2018-10-25 PROCEDURE — 82140 ASSAY OF AMMONIA: CPT

## 2018-10-25 PROCEDURE — 84100 ASSAY OF PHOSPHORUS: CPT

## 2018-10-25 PROCEDURE — 80053 COMPREHEN METABOLIC PANEL: CPT

## 2018-10-25 PROCEDURE — 87206 SMEAR FLUORESCENT/ACID STAI: CPT

## 2018-10-25 PROCEDURE — 99285 EMERGENCY DEPT VISIT HI MDM: CPT

## 2018-10-25 PROCEDURE — 83690 ASSAY OF LIPASE: CPT

## 2018-10-25 PROCEDURE — 86850 RBC ANTIBODY SCREEN: CPT

## 2018-10-25 PROCEDURE — 36415 COLL VENOUS BLD VENIPUNCTURE: CPT

## 2018-10-25 PROCEDURE — 81001 URINALYSIS AUTO W/SCOPE: CPT

## 2018-10-25 PROCEDURE — 85610 PROTHROMBIN TIME: CPT

## 2018-10-25 PROCEDURE — 83735 ASSAY OF MAGNESIUM: CPT

## 2018-10-25 PROCEDURE — 96366 THER/PROPH/DIAG IV INF ADDON: CPT

## 2018-10-25 PROCEDURE — 96365 THER/PROPH/DIAG IV INF INIT: CPT

## 2018-10-25 RX ADMIN — MAGNESIUM SULFATE IN DEXTROSE SCH MLS/HR: 10 INJECTION, SOLUTION INTRAVENOUS at 22:23

## 2018-10-26 LAB
ALBUMIN SERPL-MCNC: 2.6 G/DL (ref 3.5–5)
ALBUMIN SERPL-MCNC: 2.7 G/DL (ref 3.5–5)
ALBUMIN/GLOB SERPL: 0.5 {RATIO} (ref 1–2.1)
ALBUMIN/GLOB SERPL: 0.6 {RATIO} (ref 1–2.1)
ALT SERPL-CCNC: 25 U/L (ref 21–72)
ALT SERPL-CCNC: 31 U/L (ref 21–72)
ANISOCYTOSIS BLD QL SMEAR: SLIGHT
ANISOCYTOSIS BLD QL SMEAR: SLIGHT
AST SERPL-CCNC: 49 U/L (ref 17–59)
AST SERPL-CCNC: 50 U/L (ref 17–59)
BASOPHILS # BLD AUTO: 0 K/UL (ref 0–0.2)
BASOPHILS # BLD AUTO: 0 K/UL (ref 0–0.2)
BASOPHILS NFR BLD: 0.6 % (ref 0–2)
BASOPHILS NFR BLD: 0.7 % (ref 0–2)
BUN SERPL-MCNC: 8 MG/DL (ref 9–20)
BUN SERPL-MCNC: 9 MG/DL (ref 9–20)
CALCIUM SERPL-MCNC: 8.3 MG/DL (ref 8.6–10.4)
CALCIUM SERPL-MCNC: 8.4 MG/DL (ref 8.6–10.4)
EOSINOPHIL # BLD AUTO: 0 K/UL (ref 0–0.7)
EOSINOPHIL # BLD AUTO: 0 K/UL (ref 0–0.7)
EOSINOPHIL NFR BLD: 1 % (ref 0–4)
EOSINOPHIL NFR BLD: 1.1 % (ref 0–4)
EOSINOPHIL NFR BLD: 1.1 % (ref 0–4)
EOSINOPHIL NFR BLD: 2 % (ref 0–4)
ERYTHROCYTE [DISTWIDTH] IN BLOOD BY AUTOMATED COUNT: 18 % (ref 11.5–14.5)
ERYTHROCYTE [DISTWIDTH] IN BLOOD BY AUTOMATED COUNT: 18.1 % (ref 11.5–14.5)
GFR NON-AFRICAN AMERICAN: > 60
GFR NON-AFRICAN AMERICAN: > 60
HGB BLD-MCNC: 10.9 G/DL (ref 12–18)
HGB BLD-MCNC: 10.9 G/DL (ref 12–18)
HYPOCHROMIC: SLIGHT
HYPOCHROMIC: SLIGHT
LYMPHOCYTE: 17 % (ref 20–40)
LYMPHOCYTE: 25 % (ref 20–40)
LYMPHOCYTES # BLD AUTO: 0.6 K/UL (ref 1–4.3)
LYMPHOCYTES # BLD AUTO: 0.6 K/UL (ref 1–4.3)
LYMPHOCYTES NFR BLD AUTO: 17.3 % (ref 20–40)
LYMPHOCYTES NFR BLD AUTO: 22.4 % (ref 20–40)
MCH RBC QN AUTO: 30.3 PG (ref 27–31)
MCH RBC QN AUTO: 30.6 PG (ref 27–31)
MCHC RBC AUTO-ENTMCNC: 33.1 G/DL (ref 33–37)
MCHC RBC AUTO-ENTMCNC: 33.3 G/DL (ref 33–37)
MCV RBC AUTO: 91.6 FL (ref 80–94)
MCV RBC AUTO: 91.8 FL (ref 80–94)
MICROCYTES BLD QL SMEAR: SLIGHT
MONOCYTE: 19 % (ref 0–10)
MONOCYTE: 20 % (ref 0–10)
MONOCYTES # BLD: 0.6 K/UL (ref 0–0.8)
MONOCYTES # BLD: 0.8 K/UL (ref 0–0.8)
MONOCYTES NFR BLD: 22.2 % (ref 0–10)
MONOCYTES NFR BLD: 22.4 % (ref 0–10)
NEUTROPHILS # BLD: 1.5 K/UL (ref 1.8–7)
NEUTROPHILS # BLD: 2 K/UL (ref 1.8–7)
NEUTROPHILS NFR BLD AUTO: 53 % (ref 50–75)
NEUTROPHILS NFR BLD AUTO: 53.4 % (ref 50–75)
NEUTROPHILS NFR BLD AUTO: 58.8 % (ref 50–75)
NEUTROPHILS NFR BLD AUTO: 62 % (ref 50–75)
NEUTS BAND NFR BLD: 1 % (ref 0–2)
NRBC BLD AUTO-RTO: 0 % (ref 0–2)
NRBC BLD AUTO-RTO: 0.1 % (ref 0–2)
PLATELET # BLD EST: (no result) 10*3/UL
PLATELET # BLD EST: NORMAL 10*3/UL
PLATELET # BLD: 128 K/UL (ref 130–400)
PLATELET # BLD: 136 K/UL (ref 130–400)
PMV BLD AUTO: 8.2 FL (ref 7.2–11.7)
PMV BLD AUTO: 8.2 FL (ref 7.2–11.7)
POIKILOCYTOSIS BLD QL SMEAR: SLIGHT
POIKILOCYTOSIS BLD QL SMEAR: SLIGHT
RBC # BLD AUTO: 3.57 MIL/UL (ref 4.4–5.9)
RBC # BLD AUTO: 3.58 MIL/UL (ref 4.4–5.9)
TARGETS BLD QL SMEAR: SLIGHT
TEARDROP CELLS: SLIGHT
TOTAL CELLS COUNTED BLD: 100
TOTAL CELLS COUNTED BLD: 100
WBC # BLD AUTO: 2.7 K/UL (ref 4.8–10.8)
WBC # BLD AUTO: 3.4 K/UL (ref 4.8–10.8)

## 2018-10-26 RX ADMIN — POTASSIUM CHLORIDE SCH MEQ: 20 TABLET, EXTENDED RELEASE ORAL at 10:58

## 2018-10-26 RX ADMIN — PANTOPRAZOLE SODIUM SCH MG: 20 TABLET, DELAYED RELEASE ORAL at 09:54

## 2018-10-26 RX ADMIN — MAGNESIUM SULFATE IN DEXTROSE SCH MLS/HR: 10 INJECTION, SOLUTION INTRAVENOUS at 12:54

## 2018-10-26 RX ADMIN — Medication SCH MG: at 19:06

## 2018-10-26 RX ADMIN — MAGNESIUM SULFATE IN DEXTROSE SCH MLS/HR: 10 INJECTION, SOLUTION INTRAVENOUS at 00:14

## 2018-10-26 RX ADMIN — MAGNESIUM SULFATE IN DEXTROSE SCH MLS/HR: 10 INJECTION, SOLUTION INTRAVENOUS at 10:58

## 2018-10-27 VITALS — OXYGEN SATURATION: 98 % | TEMPERATURE: 99.4 F | SYSTOLIC BLOOD PRESSURE: 107 MMHG | DIASTOLIC BLOOD PRESSURE: 61 MMHG

## 2018-10-27 VITALS — HEART RATE: 75 BPM

## 2018-10-27 LAB
ALBUMIN SERPL-MCNC: 2.5 G/DL (ref 3.5–5)
ALBUMIN/GLOB SERPL: 0.5 {RATIO} (ref 1–2.1)
ALT SERPL-CCNC: 26 U/L (ref 21–72)
ANISOCYTOSIS BLD QL SMEAR: SLIGHT
AST SERPL-CCNC: 53 U/L (ref 17–59)
BASOPHILS # BLD AUTO: 0 K/UL (ref 0–0.2)
BASOPHILS NFR BLD: 0.3 % (ref 0–2)
BASOPHILS NFR BLD: 1 % (ref 0–2)
BUN SERPL-MCNC: 9 MG/DL (ref 9–20)
CALCIUM SERPL-MCNC: 8.4 MG/DL (ref 8.6–10.4)
EOSINOPHIL # BLD AUTO: 0 K/UL (ref 0–0.7)
EOSINOPHIL NFR BLD: 1.6 % (ref 0–4)
ERYTHROCYTE [DISTWIDTH] IN BLOOD BY AUTOMATED COUNT: 17.8 % (ref 11.5–14.5)
GFR NON-AFRICAN AMERICAN: > 60
HGB BLD-MCNC: 10.7 G/DL (ref 12–18)
LYMPHOCYTE: 22 % (ref 20–40)
LYMPHOCYTES # BLD AUTO: 0.8 K/UL (ref 1–4.3)
LYMPHOCYTES NFR BLD AUTO: 28.2 % (ref 20–40)
MCH RBC QN AUTO: 31 PG (ref 27–31)
MCHC RBC AUTO-ENTMCNC: 33.8 G/DL (ref 33–37)
MCV RBC AUTO: 91.8 FL (ref 80–94)
MONOCYTE: 26 % (ref 0–10)
MONOCYTES # BLD: 0.9 K/UL (ref 0–0.8)
MONOCYTES NFR BLD: 30.2 % (ref 0–10)
NEUTROPHILS # BLD: 1.1 K/UL (ref 1.8–7)
NEUTROPHILS NFR BLD AUTO: 39.7 % (ref 50–75)
NEUTROPHILS NFR BLD AUTO: 49 % (ref 50–75)
NEUTS BAND NFR BLD: 2 % (ref 0–2)
NRBC BLD AUTO-RTO: 0.1 % (ref 0–2)
PLATELET # BLD EST: (no result) 10*3/UL
PLATELET # BLD: 119 K/UL (ref 130–400)
PMV BLD AUTO: 8.1 FL (ref 7.2–11.7)
RBC # BLD AUTO: 3.44 MIL/UL (ref 4.4–5.9)
TOTAL CELLS COUNTED BLD: 100
WBC # BLD AUTO: 2.8 K/UL (ref 4.8–10.8)

## 2018-10-27 RX ADMIN — PANTOPRAZOLE SODIUM SCH MG: 20 TABLET, DELAYED RELEASE ORAL at 10:15

## 2018-10-27 RX ADMIN — Medication SCH MG: at 10:15

## 2018-10-27 RX ADMIN — POTASSIUM CHLORIDE SCH MEQ: 20 TABLET, EXTENDED RELEASE ORAL at 10:17

## 2018-10-27 RX ADMIN — Medication SCH MG: at 19:09

## 2018-11-26 ENCOUNTER — HOSPITAL ENCOUNTER (INPATIENT)
Dept: HOSPITAL 31 - C.ER | Age: 48
LOS: 3 days | Discharge: HOME | DRG: 433 | End: 2018-11-29
Attending: INTERNAL MEDICINE | Admitting: INTERNAL MEDICINE
Payer: MEDICARE

## 2018-11-26 VITALS — RESPIRATION RATE: 20 BRPM

## 2018-11-26 VITALS — BODY MASS INDEX: 40.6 KG/M2

## 2018-11-26 DIAGNOSIS — I11.0: ICD-10-CM

## 2018-11-26 DIAGNOSIS — Z91.14: ICD-10-CM

## 2018-11-26 DIAGNOSIS — K70.30: Primary | ICD-10-CM

## 2018-11-26 DIAGNOSIS — I48.91: ICD-10-CM

## 2018-11-26 DIAGNOSIS — F31.9: ICD-10-CM

## 2018-11-26 DIAGNOSIS — Z90.49: ICD-10-CM

## 2018-11-26 DIAGNOSIS — I50.9: ICD-10-CM

## 2018-11-26 DIAGNOSIS — E66.01: ICD-10-CM

## 2018-11-26 DIAGNOSIS — F10.10: ICD-10-CM

## 2018-11-26 DIAGNOSIS — G31.9: ICD-10-CM

## 2018-11-26 DIAGNOSIS — E11.9: ICD-10-CM

## 2018-11-26 DIAGNOSIS — F20.9: ICD-10-CM

## 2018-11-26 DIAGNOSIS — G62.9: ICD-10-CM

## 2018-11-26 DIAGNOSIS — F17.210: ICD-10-CM

## 2018-11-26 DIAGNOSIS — J44.9: ICD-10-CM

## 2018-11-26 DIAGNOSIS — E72.20: ICD-10-CM

## 2018-11-26 DIAGNOSIS — R27.8: ICD-10-CM
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
## 2018-11-26 DIAGNOSIS — K70.40: ICD-10-CM

## 2018-11-26 LAB
ALBUMIN SERPL-MCNC: 3.1 G/DL (ref 3.5–5)
ALBUMIN/GLOB SERPL: 0.6 {RATIO} (ref 1–2.1)
ALT SERPL-CCNC: 34 U/L (ref 21–72)
APTT BLD: 35 SECONDS (ref 21–34)
ARTERIAL BLOOD GAS O2 SAT: 96.4 % (ref 95–98)
ARTERIAL BLOOD GAS PCO2: 34 MM/HG (ref 35–45)
ARTERIAL BLOOD GAS TCO2: 25.2 MMOL/L (ref 22–28)
ARTERIAL PATENCY WRIST A: (no result)
AST SERPL-CCNC: 60 U/L (ref 17–59)
BASOPHILS # BLD AUTO: 0 K/UL (ref 0–0.2)
BASOPHILS NFR BLD: 1.1 % (ref 0–2)
BILIRUB UR-MCNC: NEGATIVE MG/DL
BUN SERPL-MCNC: 14 MG/DL (ref 9–20)
CALCIUM SERPL-MCNC: 8.5 MG/DL (ref 8.6–10.4)
EOSINOPHIL # BLD AUTO: 0.2 K/UL (ref 0–0.7)
EOSINOPHIL NFR BLD: 5.7 % (ref 0–4)
ERYTHROCYTE [DISTWIDTH] IN BLOOD BY AUTOMATED COUNT: 16.9 % (ref 11.5–14.5)
GFR NON-AFRICAN AMERICAN: > 60
GLUCOSE UR STRIP-MCNC: NORMAL MG/DL
HCO3 BLDA-SCNC: 25.5 MMOL/L (ref 21–28)
HGB BLD-MCNC: 12.1 G/DL (ref 12–18)
INHALED O2 CONCENTRATION: 21 %
INR PPP: 1.6
LEUKOCYTE ESTERASE UR-ACNC: (no result) LEU/UL
LIPASE: 155 U/L (ref 23–300)
LYMPHOCYTES # BLD AUTO: 1.6 K/UL (ref 1–4.3)
LYMPHOCYTES NFR BLD AUTO: 46.9 % (ref 20–40)
MCH RBC QN AUTO: 30.6 PG (ref 27–31)
MCHC RBC AUTO-ENTMCNC: 33.9 G/DL (ref 33–37)
MCV RBC AUTO: 90.3 FL (ref 80–94)
MONOCYTES # BLD: 0.6 K/UL (ref 0–0.8)
MONOCYTES NFR BLD: 17.3 % (ref 0–10)
NEUTROPHILS # BLD: 1 K/UL (ref 1.8–7)
NEUTROPHILS NFR BLD AUTO: 29 % (ref 50–75)
NRBC BLD AUTO-RTO: 0.1 % (ref 0–2)
PH BLDA: 7.46 [PH] (ref 7.35–7.45)
PH UR STRIP: 7 [PH] (ref 5–8)
PLATELET # BLD: 131 K/UL (ref 130–400)
PMV BLD AUTO: 7.8 FL (ref 7.2–11.7)
PO2 BLDA: 69 MM/HG (ref 80–100)
PROT UR STRIP-MCNC: NEGATIVE MG/DL
PROTHROMBIN TIME: 17.2 SECONDS (ref 9.7–12.2)
RBC # BLD AUTO: 3.96 MIL/UL (ref 4.4–5.9)
RBC # UR STRIP: NEGATIVE /UL
SP GR UR STRIP: 1.01 (ref 1–1.03)
UROBILINOGEN UR-MCNC: NORMAL MG/DL (ref 0.2–1)
WBC # BLD AUTO: 3.5 K/UL (ref 4.8–10.8)

## 2018-11-26 RX ADMIN — CILOSTAZOL SCH MG: 100 TABLET ORAL at 21:34

## 2018-11-26 RX ADMIN — POTASSIUM CHLORIDE SCH MEQ: 20 TABLET, EXTENDED RELEASE ORAL at 18:04

## 2018-11-27 LAB
ALBUMIN SERPL-MCNC: 2.7 G/DL (ref 3.5–5)
ALBUMIN/GLOB SERPL: 0.6 {RATIO} (ref 1–2.1)
ALT SERPL-CCNC: 31 U/L (ref 21–72)
AST SERPL-CCNC: 59 U/L (ref 17–59)
BUN SERPL-MCNC: 11 MG/DL (ref 9–20)
CALCIUM SERPL-MCNC: 8.4 MG/DL (ref 8.6–10.4)
GFR NON-AFRICAN AMERICAN: > 60

## 2018-11-27 RX ADMIN — ENOXAPARIN SODIUM SCH MG: 40 INJECTION SUBCUTANEOUS at 10:14

## 2018-11-27 RX ADMIN — CILOSTAZOL SCH MG: 100 TABLET ORAL at 11:38

## 2018-11-27 RX ADMIN — POTASSIUM CHLORIDE SCH MEQ: 20 TABLET, EXTENDED RELEASE ORAL at 10:14

## 2018-11-27 RX ADMIN — CILOSTAZOL SCH MG: 100 TABLET ORAL at 21:34

## 2018-11-28 LAB
BUN SERPL-MCNC: 13 MG/DL (ref 9–20)
CALCIUM SERPL-MCNC: 8 MG/DL (ref 8.6–10.4)
GFR NON-AFRICAN AMERICAN: > 60

## 2018-11-28 RX ADMIN — MAGNESIUM SULFATE IN DEXTROSE SCH MLS/HR: 10 INJECTION, SOLUTION INTRAVENOUS at 14:41

## 2018-11-28 RX ADMIN — CILOSTAZOL SCH MG: 100 TABLET ORAL at 09:19

## 2018-11-28 RX ADMIN — CILOSTAZOL SCH MG: 100 TABLET ORAL at 21:09

## 2018-11-28 RX ADMIN — ENOXAPARIN SODIUM SCH MG: 40 INJECTION SUBCUTANEOUS at 09:20

## 2018-11-28 RX ADMIN — MAGNESIUM SULFATE IN DEXTROSE SCH MLS/HR: 10 INJECTION, SOLUTION INTRAVENOUS at 14:48

## 2018-11-28 RX ADMIN — POTASSIUM CHLORIDE SCH MEQ: 20 TABLET, EXTENDED RELEASE ORAL at 09:19

## 2018-11-29 VITALS
HEART RATE: 74 BPM | SYSTOLIC BLOOD PRESSURE: 138 MMHG | OXYGEN SATURATION: 95 % | DIASTOLIC BLOOD PRESSURE: 72 MMHG | TEMPERATURE: 98 F

## 2018-11-29 LAB
ALBUMIN SERPL-MCNC: 2.6 G/DL (ref 3.5–5)
ALBUMIN/GLOB SERPL: 0.6 {RATIO} (ref 1–2.1)
ALT SERPL-CCNC: 34 U/L (ref 21–72)
AST SERPL-CCNC: 59 U/L (ref 17–59)
BASOPHILS # BLD AUTO: 0 K/UL (ref 0–0.2)
BASOPHILS NFR BLD: 0.5 % (ref 0–2)
BUN SERPL-MCNC: 14 MG/DL (ref 9–20)
CALCIUM SERPL-MCNC: 7.9 MG/DL (ref 8.6–10.4)
EOSINOPHIL # BLD AUTO: 0.1 K/UL (ref 0–0.7)
EOSINOPHIL NFR BLD: 4 % (ref 0–4)
ERYTHROCYTE [DISTWIDTH] IN BLOOD BY AUTOMATED COUNT: 16.5 % (ref 11.5–14.5)
GFR NON-AFRICAN AMERICAN: > 60
HGB BLD-MCNC: 11.3 G/DL (ref 12–18)
LYMPHOCYTES # BLD AUTO: 1.1 K/UL (ref 1–4.3)
LYMPHOCYTES NFR BLD AUTO: 34.8 % (ref 20–40)
MCH RBC QN AUTO: 30.6 PG (ref 27–31)
MCHC RBC AUTO-ENTMCNC: 33.9 G/DL (ref 33–37)
MCV RBC AUTO: 90.5 FL (ref 80–94)
MONOCYTES # BLD: 0.5 K/UL (ref 0–0.8)
MONOCYTES NFR BLD: 16.5 % (ref 0–10)
NEUTROPHILS # BLD: 1.5 K/UL (ref 1.8–7)
NEUTROPHILS NFR BLD AUTO: 44.2 % (ref 50–75)
NRBC BLD AUTO-RTO: 0.2 % (ref 0–2)
PLATELET # BLD: 124 K/UL (ref 130–400)
PMV BLD AUTO: 8.8 FL (ref 7.2–11.7)
RBC # BLD AUTO: 3.68 MIL/UL (ref 4.4–5.9)
WBC # BLD AUTO: 3.3 K/UL (ref 4.8–10.8)

## 2018-11-29 RX ADMIN — CILOSTAZOL SCH MG: 100 TABLET ORAL at 09:16

## 2018-11-29 RX ADMIN — ENOXAPARIN SODIUM SCH MG: 40 INJECTION SUBCUTANEOUS at 09:16

## 2018-11-29 RX ADMIN — POTASSIUM CHLORIDE SCH MEQ: 20 TABLET, EXTENDED RELEASE ORAL at 09:17

## 2018-11-29 RX ADMIN — MAGNESIUM SULFATE IN DEXTROSE SCH MLS/HR: 10 INJECTION, SOLUTION INTRAVENOUS at 13:37

## 2018-11-29 RX ADMIN — MAGNESIUM SULFATE IN DEXTROSE SCH MLS/HR: 10 INJECTION, SOLUTION INTRAVENOUS at 13:17

## 2019-01-17 ENCOUNTER — HOSPITAL ENCOUNTER (INPATIENT)
Dept: HOSPITAL 31 - C.ER | Age: 49
LOS: 5 days | Discharge: LEFT BEFORE DISCHARGE | DRG: 871 | End: 2019-01-22
Attending: INTERNAL MEDICINE | Admitting: INTERNAL MEDICINE
Payer: MEDICARE

## 2019-01-17 VITALS — BODY MASS INDEX: 40.6 KG/M2

## 2019-01-17 DIAGNOSIS — K72.00: ICD-10-CM

## 2019-01-17 DIAGNOSIS — I11.0: ICD-10-CM

## 2019-01-17 DIAGNOSIS — A41.9: Primary | ICD-10-CM

## 2019-01-17 DIAGNOSIS — L03.115: ICD-10-CM

## 2019-01-17 DIAGNOSIS — J44.9: ICD-10-CM

## 2019-01-17 DIAGNOSIS — F20.9: ICD-10-CM

## 2019-01-17 DIAGNOSIS — G93.41: ICD-10-CM
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
## 2019-01-17 DIAGNOSIS — I48.91: ICD-10-CM

## 2019-01-17 DIAGNOSIS — K65.2: ICD-10-CM

## 2019-01-17 DIAGNOSIS — L03.116: ICD-10-CM

## 2019-01-17 DIAGNOSIS — F10.239: ICD-10-CM

## 2019-01-17 DIAGNOSIS — I50.9: ICD-10-CM

## 2019-01-17 DIAGNOSIS — D69.6: ICD-10-CM

## 2019-01-17 DIAGNOSIS — F31.9: ICD-10-CM

## 2019-01-17 DIAGNOSIS — N17.9: ICD-10-CM

## 2019-01-17 DIAGNOSIS — R65.20: ICD-10-CM

## 2019-01-17 DIAGNOSIS — E87.6: ICD-10-CM

## 2019-01-17 DIAGNOSIS — K70.30: ICD-10-CM

## 2019-01-17 DIAGNOSIS — D64.9: ICD-10-CM

## 2019-01-17 DIAGNOSIS — I87.2: ICD-10-CM

## 2019-01-17 LAB
ALBUMIN SERPL-MCNC: 2.7 G/DL (ref 3.5–5)
ALBUMIN/GLOB SERPL: 0.5 {RATIO} (ref 1–2.1)
ALT SERPL-CCNC: 21 U/L (ref 21–72)
ANISOCYTOSIS BLD QL SMEAR: SLIGHT
APTT BLD: 33 SECONDS (ref 21–34)
AST SERPL-CCNC: 69 U/L (ref 17–59)
BASE EXCESS BLDV CALC-SCNC: -2 MMOL/L (ref 0–2)
BASOPHILS # BLD AUTO: 0 K/UL (ref 0–0.2)
BASOPHILS NFR BLD: 0.2 % (ref 0–2)
BNP SERPL-MCNC: 369 PG/ML (ref 0–450)
BUN SERPL-MCNC: 22 MG/DL (ref 9–20)
CALCIUM SERPL-MCNC: 6.6 MG/DL (ref 8.6–10.4)
EOSINOPHIL # BLD AUTO: 0 K/UL (ref 0–0.7)
EOSINOPHIL NFR BLD: 0.3 % (ref 0–4)
ERYTHROCYTE [DISTWIDTH] IN BLOOD BY AUTOMATED COUNT: 18.3 % (ref 11.5–14.5)
GFR NON-AFRICAN AMERICAN: > 60
HGB BLD-MCNC: 11.5 G/DL (ref 12–18)
INR PPP: 1.9
LG PLATELETS BLD QL SMEAR: PRESENT
LIPASE: 133 U/L (ref 23–300)
LYMPHOCYTE: 5 % (ref 20–40)
LYMPHOCYTES # BLD AUTO: 0.5 K/UL (ref 1–4.3)
LYMPHOCYTES NFR BLD AUTO: 7 % (ref 20–40)
MCH RBC QN AUTO: 29 PG (ref 27–31)
MCHC RBC AUTO-ENTMCNC: 33.6 G/DL (ref 33–37)
MCV RBC AUTO: 86.3 FL (ref 80–94)
MONOCYTE: 1 % (ref 0–10)
MONOCYTES # BLD: 0 K/UL (ref 0–0.8)
MONOCYTES NFR BLD: 0.3 % (ref 0–10)
MYELOCYTES NFR BLD: 1 % (ref 0–0)
NEUTROPHILS # BLD: 6.7 K/UL (ref 1.8–7)
NEUTROPHILS NFR BLD AUTO: 83 % (ref 50–75)
NEUTROPHILS NFR BLD AUTO: 92.2 % (ref 50–75)
NEUTS BAND NFR BLD: 10 % (ref 0–2)
NRBC BLD AUTO-RTO: 0.1 % (ref 0–2)
PCO2 BLDV: 36 MMHG (ref 40–60)
PH BLDV: 7.4 [PH] (ref 7.32–7.43)
PLATELET # BLD EST: NORMAL 10*3/UL
PLATELET # BLD: 132 K/UL (ref 130–400)
PMV BLD AUTO: 8.1 FL (ref 7.2–11.7)
PROTHROMBIN TIME: 20.8 SECONDS (ref 9.7–12.2)
RBC # BLD AUTO: 3.97 MIL/UL (ref 4.4–5.9)
TOTAL CELLS COUNTED BLD: 100
VENOUS BLOOD GAS PO2: 42 MM/HG (ref 30–55)
WBC # BLD AUTO: 7.3 K/UL (ref 4.8–10.8)

## 2019-01-18 LAB
BACTERIA #/AREA URNS HPF: (no result) /[HPF]
BILIRUB UR-MCNC: NEGATIVE MG/DL
GLUCOSE UR STRIP-MCNC: NORMAL MG/DL
HYALINE CASTS #/AREA URNS LPF: (no result) /LPF (ref 0–2)
LEUKOCYTE ESTERASE UR-ACNC: (no result) LEU/UL
PARASITE BLD: NEGATIVE
PH UR STRIP: 5 [PH] (ref 5–8)
PROT UR STRIP-MCNC: (no result) MG/DL
RBC # UR STRIP: NEGATIVE /UL
SP GR UR STRIP: 1.01 (ref 1–1.03)
SQUAMOUS EPITHIAL: 2 /HPF (ref 0–5)
UROBILINOGEN UR-MCNC: 4 MG/DL (ref 0.2–1)

## 2019-01-18 RX ADMIN — POTASSIUM CHLORIDE SCH MEQ: 20 TABLET, EXTENDED RELEASE ORAL at 11:00

## 2019-01-18 RX ADMIN — CILOSTAZOL SCH MG: 100 TABLET ORAL at 11:00

## 2019-01-18 RX ADMIN — SODIUM CHLORIDE SCH MLS/HR: 9 INJECTION, SOLUTION INTRAVENOUS at 17:00

## 2019-01-18 RX ADMIN — IPRATROPIUM BROMIDE AND ALBUTEROL SULFATE PRN ML: .5; 3 SOLUTION RESPIRATORY (INHALATION) at 13:32

## 2019-01-18 RX ADMIN — VANCOMYCIN HYDROCHLORIDE SCH MLS/HR: 1 INJECTION, POWDER, LYOPHILIZED, FOR SOLUTION INTRAVENOUS at 19:32

## 2019-01-18 RX ADMIN — SODIUM CHLORIDE SCH MLS/HR: 9 INJECTION, SOLUTION INTRAVENOUS at 07:58

## 2019-01-18 RX ADMIN — IPRATROPIUM BROMIDE AND ALBUTEROL SULFATE PRN ML: .5; 3 SOLUTION RESPIRATORY (INHALATION) at 07:55

## 2019-01-18 RX ADMIN — CILOSTAZOL SCH MG: 100 TABLET ORAL at 21:27

## 2019-01-18 RX ADMIN — ENOXAPARIN SODIUM SCH MG: 40 INJECTION SUBCUTANEOUS at 11:00

## 2019-01-18 RX ADMIN — SODIUM CHLORIDE SCH MLS/HR: 9 INJECTION, SOLUTION INTRAVENOUS at 01:01

## 2019-01-19 LAB
ALBUMIN SERPL-MCNC: 2.2 G/DL (ref 3.5–5)
ALBUMIN/GLOB SERPL: 0.4 {RATIO} (ref 1–2.1)
ALT SERPL-CCNC: 18 U/L (ref 21–72)
AST SERPL-CCNC: 44 U/L (ref 17–59)
BASOPHILS # BLD AUTO: 0 K/UL (ref 0–0.2)
BASOPHILS NFR BLD: 0.4 % (ref 0–2)
BUN SERPL-MCNC: 16 MG/DL (ref 9–20)
CALCIUM SERPL-MCNC: 6.3 MG/DL (ref 8.6–10.4)
EOSINOPHIL # BLD AUTO: 0.1 K/UL (ref 0–0.7)
EOSINOPHIL NFR BLD: 1.3 % (ref 0–4)
ERYTHROCYTE [DISTWIDTH] IN BLOOD BY AUTOMATED COUNT: 17.8 % (ref 11.5–14.5)
GFR NON-AFRICAN AMERICAN: > 60
HEPATITIS A IGM: NEGATIVE
HEPATITIS B CORE AB: NEGATIVE
HEPATITIS C ANTIBODY: NEGATIVE
HGB BLD-MCNC: 9.3 G/DL (ref 12–18)
LYMPHOCYTES # BLD AUTO: 1 K/UL (ref 1–4.3)
LYMPHOCYTES NFR BLD AUTO: 13.9 % (ref 20–40)
MCH RBC QN AUTO: 29.3 PG (ref 27–31)
MCHC RBC AUTO-ENTMCNC: 33.6 G/DL (ref 33–37)
MCV RBC AUTO: 87.3 FL (ref 80–94)
MONOCYTES # BLD: 1.1 K/UL (ref 0–0.8)
MONOCYTES NFR BLD: 15.8 % (ref 0–10)
NEUTROPHILS # BLD: 4.9 K/UL (ref 1.8–7)
NEUTROPHILS NFR BLD AUTO: 68.6 % (ref 50–75)
NRBC BLD AUTO-RTO: 0 % (ref 0–2)
PLATELET # BLD: 114 K/UL (ref 130–400)
PMV BLD AUTO: 8.5 FL (ref 7.2–11.7)
RBC # BLD AUTO: 3.16 MIL/UL (ref 4.4–5.9)
WBC # BLD AUTO: 7.2 K/UL (ref 4.8–10.8)

## 2019-01-19 RX ADMIN — SODIUM CHLORIDE SCH MLS/HR: 9 INJECTION, SOLUTION INTRAVENOUS at 00:37

## 2019-01-19 RX ADMIN — IPRATROPIUM BROMIDE AND ALBUTEROL SULFATE PRN ML: .5; 3 SOLUTION RESPIRATORY (INHALATION) at 02:03

## 2019-01-19 RX ADMIN — POTASSIUM CHLORIDE SCH MEQ: 20 TABLET, EXTENDED RELEASE ORAL at 10:06

## 2019-01-19 RX ADMIN — IPRATROPIUM BROMIDE AND ALBUTEROL SULFATE PRN ML: .5; 3 SOLUTION RESPIRATORY (INHALATION) at 19:45

## 2019-01-19 RX ADMIN — SODIUM CHLORIDE SCH MLS/HR: 9 INJECTION, SOLUTION INTRAVENOUS at 08:32

## 2019-01-19 RX ADMIN — MAGNESIUM SULFATE IN DEXTROSE SCH MLS/HR: 10 INJECTION, SOLUTION INTRAVENOUS at 12:37

## 2019-01-19 RX ADMIN — VANCOMYCIN HYDROCHLORIDE SCH MLS/HR: 1 INJECTION, POWDER, LYOPHILIZED, FOR SOLUTION INTRAVENOUS at 18:56

## 2019-01-19 RX ADMIN — CILOSTAZOL SCH MG: 100 TABLET ORAL at 21:52

## 2019-01-19 RX ADMIN — MAGNESIUM SULFATE IN DEXTROSE SCH MLS/HR: 10 INJECTION, SOLUTION INTRAVENOUS at 11:08

## 2019-01-19 RX ADMIN — ENOXAPARIN SODIUM SCH MG: 40 INJECTION SUBCUTANEOUS at 10:07

## 2019-01-19 RX ADMIN — SODIUM CHLORIDE SCH MLS/HR: 9 INJECTION, SOLUTION INTRAVENOUS at 17:20

## 2019-01-19 RX ADMIN — CILOSTAZOL SCH MG: 100 TABLET ORAL at 10:06

## 2019-01-20 LAB
BUN SERPL-MCNC: 9 MG/DL (ref 9–20)
CALCIUM SERPL-MCNC: 6.6 MG/DL (ref 8.6–10.4)
GFR NON-AFRICAN AMERICAN: > 60

## 2019-01-20 RX ADMIN — MAGNESIUM SULFATE IN DEXTROSE SCH MLS/HR: 10 INJECTION, SOLUTION INTRAVENOUS at 10:18

## 2019-01-20 RX ADMIN — POTASSIUM CHLORIDE SCH MEQ: 20 TABLET, EXTENDED RELEASE ORAL at 10:19

## 2019-01-20 RX ADMIN — VANCOMYCIN HYDROCHLORIDE SCH MLS/HR: 1 INJECTION, POWDER, LYOPHILIZED, FOR SOLUTION INTRAVENOUS at 19:21

## 2019-01-20 RX ADMIN — ENOXAPARIN SODIUM SCH MG: 40 INJECTION SUBCUTANEOUS at 10:19

## 2019-01-20 RX ADMIN — MAGNESIUM SULFATE IN DEXTROSE SCH MLS/HR: 10 INJECTION, SOLUTION INTRAVENOUS at 11:09

## 2019-01-20 RX ADMIN — MAGNESIUM SULFATE IN DEXTROSE SCH MLS/HR: 10 INJECTION, SOLUTION INTRAVENOUS at 18:01

## 2019-01-20 RX ADMIN — CILOSTAZOL SCH MG: 100 TABLET ORAL at 21:21

## 2019-01-20 RX ADMIN — MAGNESIUM SULFATE IN DEXTROSE SCH MLS/HR: 10 INJECTION, SOLUTION INTRAVENOUS at 17:58

## 2019-01-20 RX ADMIN — SODIUM CHLORIDE SCH MLS/HR: 9 INJECTION, SOLUTION INTRAVENOUS at 00:04

## 2019-01-20 RX ADMIN — CILOSTAZOL SCH MG: 100 TABLET ORAL at 10:19

## 2019-01-20 RX ADMIN — SODIUM CHLORIDE SCH MLS/HR: 9 INJECTION, SOLUTION INTRAVENOUS at 08:26

## 2019-01-21 VITALS — OXYGEN SATURATION: 98 %

## 2019-01-21 LAB
BUN SERPL-MCNC: 8 MG/DL (ref 9–20)
CALCIUM SERPL-MCNC: 6.6 MG/DL (ref 8.6–10.4)
GFR NON-AFRICAN AMERICAN: > 60

## 2019-01-21 RX ADMIN — VANCOMYCIN HYDROCHLORIDE SCH MLS/HR: 1 INJECTION, POWDER, LYOPHILIZED, FOR SOLUTION INTRAVENOUS at 18:36

## 2019-01-21 RX ADMIN — MAGNESIUM SULFATE IN DEXTROSE SCH MLS/HR: 10 INJECTION, SOLUTION INTRAVENOUS at 15:46

## 2019-01-21 RX ADMIN — CILOSTAZOL SCH MG: 100 TABLET ORAL at 10:46

## 2019-01-21 RX ADMIN — MAGNESIUM SULFATE IN DEXTROSE SCH MLS/HR: 10 INJECTION, SOLUTION INTRAVENOUS at 10:47

## 2019-01-21 RX ADMIN — POTASSIUM CHLORIDE SCH MEQ: 20 TABLET, EXTENDED RELEASE ORAL at 16:13

## 2019-01-21 RX ADMIN — MAGNESIUM SULFATE IN DEXTROSE SCH MLS/HR: 10 INJECTION, SOLUTION INTRAVENOUS at 14:36

## 2019-01-21 RX ADMIN — MAGNESIUM SULFATE IN DEXTROSE SCH MLS/HR: 10 INJECTION, SOLUTION INTRAVENOUS at 08:26

## 2019-01-21 RX ADMIN — POTASSIUM CHLORIDE SCH MEQ: 20 TABLET, EXTENDED RELEASE ORAL at 10:45

## 2019-01-21 RX ADMIN — CILOSTAZOL SCH MG: 100 TABLET ORAL at 21:35

## 2019-01-21 RX ADMIN — ENOXAPARIN SODIUM SCH MG: 40 INJECTION SUBCUTANEOUS at 10:46

## 2019-01-21 RX ADMIN — POTASSIUM CHLORIDE SCH MEQ: 20 TABLET, EXTENDED RELEASE ORAL at 12:15

## 2019-01-21 RX ADMIN — MAGNESIUM SULFATE IN DEXTROSE SCH MLS/HR: 10 INJECTION, SOLUTION INTRAVENOUS at 13:59

## 2019-01-22 VITALS — DIASTOLIC BLOOD PRESSURE: 70 MMHG | SYSTOLIC BLOOD PRESSURE: 112 MMHG

## 2019-01-22 VITALS — RESPIRATION RATE: 20 BRPM | TEMPERATURE: 97.8 F

## 2019-01-22 VITALS — HEART RATE: 94 BPM

## 2019-01-22 RX ADMIN — ENOXAPARIN SODIUM SCH MG: 40 INJECTION SUBCUTANEOUS at 09:55

## 2019-01-22 RX ADMIN — POTASSIUM CHLORIDE SCH MEQ: 20 TABLET, EXTENDED RELEASE ORAL at 09:55

## 2019-01-22 RX ADMIN — CILOSTAZOL SCH MG: 100 TABLET ORAL at 09:54

## 2019-01-23 ENCOUNTER — HOSPITAL ENCOUNTER (OUTPATIENT)
Dept: HOSPITAL 31 - C.ER | Age: 49
Setting detail: OBSERVATION
LOS: 2 days | Discharge: HOME | End: 2019-01-25
Attending: INTERNAL MEDICINE | Admitting: INTERNAL MEDICINE
Payer: MEDICARE

## 2019-01-23 VITALS — RESPIRATION RATE: 20 BRPM

## 2019-01-23 VITALS — BODY MASS INDEX: 48 KG/M2

## 2019-01-23 DIAGNOSIS — E88.09: ICD-10-CM

## 2019-01-23 DIAGNOSIS — J44.9: ICD-10-CM

## 2019-01-23 DIAGNOSIS — Z87.891: ICD-10-CM

## 2019-01-23 DIAGNOSIS — E83.42: ICD-10-CM

## 2019-01-23 DIAGNOSIS — E83.39: ICD-10-CM

## 2019-01-23 DIAGNOSIS — I11.0: ICD-10-CM

## 2019-01-23 DIAGNOSIS — E66.01: ICD-10-CM

## 2019-01-23 DIAGNOSIS — Z90.49: ICD-10-CM

## 2019-01-23 DIAGNOSIS — F10.21: ICD-10-CM

## 2019-01-23 DIAGNOSIS — D64.9: Primary | ICD-10-CM

## 2019-01-23 DIAGNOSIS — M79.605: ICD-10-CM

## 2019-01-23 DIAGNOSIS — E87.1: ICD-10-CM

## 2019-01-23 DIAGNOSIS — I50.9: ICD-10-CM

## 2019-01-23 DIAGNOSIS — Z79.82: ICD-10-CM

## 2019-01-23 DIAGNOSIS — I48.91: ICD-10-CM

## 2019-01-23 DIAGNOSIS — Z79.899: ICD-10-CM

## 2019-01-23 DIAGNOSIS — K70.30: ICD-10-CM

## 2019-01-23 DIAGNOSIS — R16.1: ICD-10-CM

## 2019-01-23 LAB
ALBUMIN SERPL-MCNC: 2.5 {NULL, G/DL} (ref 3.5–5)
ALBUMIN/GLOB SERPL: 0.4 {NULL, NULL} (ref 1–2.1)
ALT SERPL-CCNC: 17 {NULL, U/L} (ref 21–72)
APTT BLD: 32 {NULL, SECONDS} (ref 21–34)
AST SERPL-CCNC: 87 {NULL, U/L} (ref 17–59)
BASOPHILS # BLD AUTO: 0 {NULL, K/UL} (ref 0–0.2)
BASOPHILS NFR BLD: 0.8 {NULL, %} (ref 0–2)
BILIRUB UR-MCNC: NEGATIVE {NULL, NULL}
BUN SERPL-MCNC: 10 {NULL, MG/DL} (ref 9–20)
CALCIUM SERPL-MCNC: 7.2 {NULL, MG/DL} (ref 8.6–10.4)
D DIMER: 1212 {NULL, NG/MLDDU} (ref 0–243)
EOSINOPHIL # BLD AUTO: 0.1 {NULL, K/UL} (ref 0–0.7)
EOSINOPHIL NFR BLD: 1.5 {NULL, %} (ref 0–4)
ERYTHROCYTE [DISTWIDTH] IN BLOOD BY AUTOMATED COUNT: 17.9 {NULL, %} (ref 11.5–14.5)
GFR NON-AFRICAN AMERICAN: > 60 {NULL, NULL}
GLUCOSE UR STRIP-MCNC: NORMAL {NULL, MG/DL}
HGB BLD-MCNC: 9 {NULL, G/DL} (ref 12–18)
INR PPP: 1.6 {NULL, NULL}
LEUKOCYTE ESTERASE UR-ACNC: (no result) {NULL, LEU/UL}
LYMPHOCYTES # BLD AUTO: 1 {NULL, K/UL} (ref 1–4.3)
LYMPHOCYTES NFR BLD AUTO: 22.4 {NULL, %} (ref 20–40)
MCH RBC QN AUTO: 29.9 {NULL, PG} (ref 27–31)
MCHC RBC AUTO-ENTMCNC: 33.8 {NULL, G/DL} (ref 33–37)
MCV RBC AUTO: 88.4 {NULL, FL} (ref 80–94)
MONOCYTES # BLD: 0.7 {NULL, K/UL} (ref 0–0.8)
MONOCYTES NFR BLD: 16 {NULL, %} (ref 0–10)
NEUTROPHILS # BLD: 2.6 {NULL, K/UL} (ref 1.8–7)
NEUTROPHILS NFR BLD AUTO: 59.3 {NULL, %} (ref 50–75)
NRBC BLD AUTO-RTO: 0.2 {NULL, %} (ref 0–2)
OSMOLALITY,URINE: 235 {NULL, MOSM/KG} (ref 300–1000)
PH UR STRIP: 7 {NULL, NULL} (ref 5–8)
PLATELET # BLD: 176 {NULL, K/UL} (ref 130–400)
PMV BLD AUTO: 8.4 {NULL, FL} (ref 7.2–11.7)
PROT UR STRIP-MCNC: NEGATIVE {NULL, MG/DL}
PROTHROMBIN TIME: 17.2 {NULL, SECONDS} (ref 9.7–12.2)
RBC # BLD AUTO: 3 {NULL, MIL/UL} (ref 4.4–5.9)
RBC # UR STRIP: NEGATIVE {NULL, NULL}
SP GR UR STRIP: 1.01 {NULL, NULL} (ref 1–1.03)
SQUAMOUS EPITHIAL: < 1 {NULL, /HPF} (ref 0–5)
UROBILINOGEN UR-MCNC: NORMAL {NULL, MG/DL} (ref 0.2–1)
WBC # BLD AUTO: 4.3 {NULL, K/UL} (ref 4.8–10.8)

## 2019-01-23 PROCEDURE — 85378 FIBRIN DEGRADE SEMIQUANT: CPT

## 2019-01-23 PROCEDURE — 84300 ASSAY OF URINE SODIUM: CPT

## 2019-01-23 PROCEDURE — 82948 REAGENT STRIP/BLOOD GLUCOSE: CPT

## 2019-01-23 PROCEDURE — 85025 COMPLETE CBC W/AUTO DIFF WBC: CPT

## 2019-01-23 PROCEDURE — 96375 TX/PRO/DX INJ NEW DRUG ADDON: CPT

## 2019-01-23 PROCEDURE — 81001 URINALYSIS AUTO W/SCOPE: CPT

## 2019-01-23 PROCEDURE — 82306 VITAMIN D 25 HYDROXY: CPT

## 2019-01-23 PROCEDURE — 96376 TX/PRO/DX INJ SAME DRUG ADON: CPT

## 2019-01-23 PROCEDURE — 99285 EMERGENCY DEPT VISIT HI MDM: CPT

## 2019-01-23 PROCEDURE — 85610 PROTHROMBIN TIME: CPT

## 2019-01-23 PROCEDURE — 97116 GAIT TRAINING THERAPY: CPT

## 2019-01-23 PROCEDURE — 78582 LUNG VENTILAT&PERFUS IMAGING: CPT

## 2019-01-23 PROCEDURE — 97165 OT EVAL LOW COMPLEX 30 MIN: CPT

## 2019-01-23 PROCEDURE — 82140 ASSAY OF AMMONIA: CPT

## 2019-01-23 PROCEDURE — 83735 ASSAY OF MAGNESIUM: CPT

## 2019-01-23 PROCEDURE — 82570 ASSAY OF URINE CREATININE: CPT

## 2019-01-23 PROCEDURE — 83935 ASSAY OF URINE OSMOLALITY: CPT

## 2019-01-23 PROCEDURE — 97530 THERAPEUTIC ACTIVITIES: CPT

## 2019-01-23 PROCEDURE — 80048 BASIC METABOLIC PNL TOTAL CA: CPT

## 2019-01-23 PROCEDURE — 96361 HYDRATE IV INFUSION ADD-ON: CPT

## 2019-01-23 PROCEDURE — 84100 ASSAY OF PHOSPHORUS: CPT

## 2019-01-23 PROCEDURE — 93005 ELECTROCARDIOGRAM TRACING: CPT

## 2019-01-23 PROCEDURE — 93970 EXTREMITY STUDY: CPT

## 2019-01-23 PROCEDURE — 96366 THER/PROPH/DIAG IV INF ADDON: CPT

## 2019-01-23 PROCEDURE — 96365 THER/PROPH/DIAG IV INF INIT: CPT

## 2019-01-23 PROCEDURE — 36415 COLL VENOUS BLD VENIPUNCTURE: CPT

## 2019-01-23 PROCEDURE — 85730 THROMBOPLASTIN TIME PARTIAL: CPT

## 2019-01-23 PROCEDURE — 82043 UR ALBUMIN QUANTITATIVE: CPT

## 2019-01-23 PROCEDURE — 97161 PT EVAL LOW COMPLEX 20 MIN: CPT

## 2019-01-23 PROCEDURE — 90674 CCIIV4 VAC NO PRSV 0.5 ML IM: CPT

## 2019-01-23 PROCEDURE — 84156 ASSAY OF PROTEIN URINE: CPT

## 2019-01-23 PROCEDURE — 80053 COMPREHEN METABOLIC PANEL: CPT

## 2019-01-23 PROCEDURE — 96372 THER/PROPH/DIAG INJ SC/IM: CPT

## 2019-01-23 RX ADMIN — MAGNESIUM SULFATE IN DEXTROSE SCH MLS/HR: 10 INJECTION, SOLUTION INTRAVENOUS at 19:20

## 2019-01-23 RX ADMIN — MAGNESIUM SULFATE IN DEXTROSE SCH MLS/HR: 10 INJECTION, SOLUTION INTRAVENOUS at 20:25

## 2019-01-23 RX ADMIN — Medication SCH MG: at 18:58

## 2019-01-24 LAB
ALBUMIN SERPL-MCNC: 2.6 {NULL, G/DL} (ref 3.5–5)
ALBUMIN/GLOB SERPL: 0.4 {NULL, NULL} (ref 1–2.1)
ALT SERPL-CCNC: 18 {NULL, U/L} (ref 21–72)
AST SERPL-CCNC: 56 {NULL, U/L} (ref 17–59)
BASOPHILS # BLD AUTO: 0 {NULL, K/UL} (ref 0–0.2)
BASOPHILS NFR BLD: 0.7 {NULL, %} (ref 0–2)
BUN SERPL-MCNC: 9 {NULL, MG/DL} (ref 9–20)
CALCIUM SERPL-MCNC: 7.4 {NULL, MG/DL} (ref 8.6–10.4)
EOSINOPHIL # BLD AUTO: 0.1 {NULL, K/UL} (ref 0–0.7)
EOSINOPHIL NFR BLD: 2.3 {NULL, %} (ref 0–4)
ERYTHROCYTE [DISTWIDTH] IN BLOOD BY AUTOMATED COUNT: 19.1 {NULL, %} (ref 11.5–14.5)
GFR NON-AFRICAN AMERICAN: > 60 {NULL, NULL}
HGB BLD-MCNC: 9.3 {NULL, G/DL} (ref 12–18)
LYMPHOCYTES # BLD AUTO: 1.4 {NULL, K/UL} (ref 1–4.3)
LYMPHOCYTES NFR BLD AUTO: 35.3 {NULL, %} (ref 20–40)
MCH RBC QN AUTO: 28.7 {NULL, PG} (ref 27–31)
MCHC RBC AUTO-ENTMCNC: 32.4 {NULL, G/DL} (ref 33–37)
MCV RBC AUTO: 88.7 {NULL, FL} (ref 80–94)
MONOCYTES # BLD: 0.6 {NULL, K/UL} (ref 0–0.8)
MONOCYTES NFR BLD: 14.9 {NULL, %} (ref 0–10)
NEUTROPHILS # BLD: 1.9 {NULL, K/UL} (ref 1.8–7)
NEUTROPHILS NFR BLD AUTO: 46.8 {NULL, %} (ref 50–75)
NRBC BLD AUTO-RTO: 0.2 {NULL, %} (ref 0–2)
PLATELET # BLD: 188 {NULL, K/UL} (ref 130–400)
PMV BLD AUTO: 7.7 {NULL, FL} (ref 7.2–11.7)
RBC # BLD AUTO: 3.24 {NULL, MIL/UL} (ref 4.4–5.9)
WBC # BLD AUTO: 4 {NULL, K/UL} (ref 4.8–10.8)

## 2019-01-24 RX ADMIN — Medication SCH MG: at 10:26

## 2019-01-24 RX ADMIN — MAGNESIUM SULFATE IN DEXTROSE SCH MLS/HR: 10 INJECTION, SOLUTION INTRAVENOUS at 10:24

## 2019-01-24 RX ADMIN — ENOXAPARIN SODIUM SCH MG: 40 INJECTION SUBCUTANEOUS at 10:25

## 2019-01-24 RX ADMIN — Medication SCH MG: at 18:22

## 2019-01-24 RX ADMIN — POTASSIUM CHLORIDE SCH MEQ: 1.5 SOLUTION ORAL at 13:50

## 2019-01-24 RX ADMIN — MAGNESIUM SULFATE IN DEXTROSE SCH MLS/HR: 10 INJECTION, SOLUTION INTRAVENOUS at 11:06

## 2019-01-25 VITALS
OXYGEN SATURATION: 97 % | HEART RATE: 64 BPM | DIASTOLIC BLOOD PRESSURE: 62 MMHG | TEMPERATURE: 97.3 F | SYSTOLIC BLOOD PRESSURE: 104 MMHG

## 2019-01-25 LAB
BUN SERPL-MCNC: 12 {NULL, MG/DL} (ref 9–20)
CALCIUM SERPL-MCNC: 7.9 {NULL, MG/DL} (ref 8.6–10.4)
GFR NON-AFRICAN AMERICAN: > 60 {NULL, NULL}

## 2019-01-25 RX ADMIN — ENOXAPARIN SODIUM SCH MG: 40 INJECTION SUBCUTANEOUS at 10:06

## 2019-01-25 RX ADMIN — Medication SCH MG: at 10:07

## 2019-01-25 RX ADMIN — MAGNESIUM SULFATE IN DEXTROSE SCH MLS/HR: 10 INJECTION, SOLUTION INTRAVENOUS at 13:31

## 2019-01-25 RX ADMIN — MAGNESIUM SULFATE IN DEXTROSE SCH MLS/HR: 10 INJECTION, SOLUTION INTRAVENOUS at 17:16

## 2019-01-25 RX ADMIN — MAGNESIUM SULFATE IN DEXTROSE SCH MLS/HR: 10 INJECTION, SOLUTION INTRAVENOUS at 14:30

## 2019-01-25 RX ADMIN — MAGNESIUM SULFATE IN DEXTROSE SCH MLS/HR: 10 INJECTION, SOLUTION INTRAVENOUS at 13:30

## 2019-01-25 RX ADMIN — POTASSIUM CHLORIDE SCH MEQ: 1.5 SOLUTION ORAL at 10:06

## 2019-01-25 RX ADMIN — Medication SCH MG: at 13:31

## 2019-01-25 RX ADMIN — Medication SCH MG: at 17:51

## 2019-02-04 ENCOUNTER — HOSPITAL ENCOUNTER (INPATIENT)
Dept: HOSPITAL 31 - C.ER | Age: 49
LOS: 3 days | Discharge: LEFT BEFORE DISCHARGE | DRG: 433 | End: 2019-02-07
Attending: INTERNAL MEDICINE | Admitting: INTERNAL MEDICINE
Payer: MEDICARE

## 2019-02-04 VITALS — BODY MASS INDEX: 48 KG/M2

## 2019-02-04 DIAGNOSIS — J44.9: ICD-10-CM

## 2019-02-04 DIAGNOSIS — Z79.4: ICD-10-CM

## 2019-02-04 DIAGNOSIS — I50.9: ICD-10-CM

## 2019-02-04 DIAGNOSIS — I48.91: ICD-10-CM

## 2019-02-04 DIAGNOSIS — K70.30: Primary | ICD-10-CM

## 2019-02-04 DIAGNOSIS — R41.82: ICD-10-CM

## 2019-02-04 DIAGNOSIS — Z86.73: ICD-10-CM

## 2019-02-04 DIAGNOSIS — E10.51: ICD-10-CM

## 2019-02-04 DIAGNOSIS — I11.0: ICD-10-CM

## 2019-02-04 DIAGNOSIS — E66.9: ICD-10-CM

## 2019-02-04 LAB
ALBUMIN SERPL-MCNC: 3 G/DL (ref 3.5–5)
ALBUMIN/GLOB SERPL: 0.5 {RATIO} (ref 1–2.1)
ALT SERPL-CCNC: 15 U/L (ref 21–72)
AST SERPL-CCNC: 55 U/L (ref 17–59)
BACTERIA #/AREA URNS HPF: (no result) /[HPF]
BASOPHILS # BLD AUTO: 0 K/UL (ref 0–0.2)
BASOPHILS NFR BLD: 0.7 % (ref 0–2)
BILIRUB UR-MCNC: NEGATIVE MG/DL
BUN SERPL-MCNC: 10 MG/DL (ref 9–20)
CALCIUM SERPL-MCNC: 8.8 MG/DL (ref 8.6–10.4)
EOSINOPHIL # BLD AUTO: 0.1 K/UL (ref 0–0.7)
EOSINOPHIL NFR BLD: 3.7 % (ref 0–4)
ERYTHROCYTE [DISTWIDTH] IN BLOOD BY AUTOMATED COUNT: 22.7 % (ref 11.5–14.5)
GFR NON-AFRICAN AMERICAN: > 60
GLUCOSE UR STRIP-MCNC: NORMAL MG/DL
HGB BLD-MCNC: 11.2 G/DL (ref 12–18)
INR PPP: 1.6
LEUKOCYTE ESTERASE UR-ACNC: (no result) LEU/UL
LYMPHOCYTES # BLD AUTO: 1.7 K/UL (ref 1–4.3)
LYMPHOCYTES NFR BLD AUTO: 45 % (ref 20–40)
MCH RBC QN AUTO: 30 PG (ref 27–31)
MCHC RBC AUTO-ENTMCNC: 32.9 G/DL (ref 33–37)
MCV RBC AUTO: 91.2 FL (ref 80–94)
MONOCYTES # BLD: 0.5 K/UL (ref 0–0.8)
MONOCYTES NFR BLD: 13.7 % (ref 0–10)
NEUTROPHILS # BLD: 1.4 K/UL (ref 1.8–7)
NEUTROPHILS NFR BLD AUTO: 36.9 % (ref 50–75)
NRBC BLD AUTO-RTO: 0.2 % (ref 0–2)
PH UR STRIP: 6 [PH] (ref 5–8)
PLATELET # BLD: 230 K/UL (ref 130–400)
PMV BLD AUTO: 7.8 FL (ref 7.2–11.7)
PROT UR STRIP-MCNC: NEGATIVE MG/DL
PROTHROMBIN TIME: 17 SECONDS (ref 9.7–12.2)
RBC # BLD AUTO: 3.72 MIL/UL (ref 4.4–5.9)
RBC # UR STRIP: NEGATIVE /UL
SP GR UR STRIP: 1.02 (ref 1–1.03)
SQUAMOUS EPITHIAL: 3 /HPF (ref 0–5)
UROBILINOGEN UR-MCNC: 2 MG/DL (ref 0.2–1)
WBC # BLD AUTO: 3.8 K/UL (ref 4.8–10.8)

## 2019-02-05 LAB
ALBUMIN SERPL-MCNC: 2.6 G/DL (ref 3.5–5)
ALBUMIN/GLOB SERPL: 0.5 {RATIO} (ref 1–2.1)
ALT SERPL-CCNC: 12 U/L (ref 21–72)
AST SERPL-CCNC: 46 U/L (ref 17–59)
BASOPHILS # BLD AUTO: 0 K/UL (ref 0–0.2)
BASOPHILS NFR BLD: 0.9 % (ref 0–2)
BUN SERPL-MCNC: 9 MG/DL (ref 9–20)
CALCIUM SERPL-MCNC: 8.4 MG/DL (ref 8.6–10.4)
EOSINOPHIL # BLD AUTO: 0.1 K/UL (ref 0–0.7)
EOSINOPHIL NFR BLD: 3.4 % (ref 0–4)
ERYTHROCYTE [DISTWIDTH] IN BLOOD BY AUTOMATED COUNT: 22.7 % (ref 11.5–14.5)
GFR NON-AFRICAN AMERICAN: > 60
HGB BLD-MCNC: 10.2 G/DL (ref 12–18)
LYMPHOCYTES # BLD AUTO: 1.5 K/UL (ref 1–4.3)
LYMPHOCYTES NFR BLD AUTO: 41.8 % (ref 20–40)
MCH RBC QN AUTO: 30.4 PG (ref 27–31)
MCHC RBC AUTO-ENTMCNC: 33.4 G/DL (ref 33–37)
MCV RBC AUTO: 91 FL (ref 80–94)
MONOCYTES # BLD: 0.6 K/UL (ref 0–0.8)
MONOCYTES NFR BLD: 16.8 % (ref 0–10)
NEUTROPHILS # BLD: 1.3 K/UL (ref 1.8–7)
NEUTROPHILS NFR BLD AUTO: 37.1 % (ref 50–75)
NRBC BLD AUTO-RTO: 0.2 % (ref 0–2)
PLATELET # BLD: 192 K/UL (ref 130–400)
PMV BLD AUTO: 7.9 FL (ref 7.2–11.7)
RBC # BLD AUTO: 3.36 MIL/UL (ref 4.4–5.9)
WBC # BLD AUTO: 3.5 K/UL (ref 4.8–10.8)

## 2019-02-05 RX ADMIN — Medication SCH MG: at 09:25

## 2019-02-05 RX ADMIN — Medication SCH MG: at 13:15

## 2019-02-05 RX ADMIN — PANTOPRAZOLE SODIUM SCH MG: 40 TABLET, DELAYED RELEASE ORAL at 09:25

## 2019-02-05 RX ADMIN — CILOSTAZOL SCH: 100 TABLET ORAL at 09:32

## 2019-02-05 RX ADMIN — CILOSTAZOL SCH MG: 100 TABLET ORAL at 13:11

## 2019-02-05 RX ADMIN — POTASSIUM CHLORIDE SCH MEQ: 20 TABLET, EXTENDED RELEASE ORAL at 09:25

## 2019-02-05 RX ADMIN — MAGNESIUM SULFATE IN DEXTROSE SCH MLS/HR: 10 INJECTION, SOLUTION INTRAVENOUS at 21:20

## 2019-02-05 RX ADMIN — FOLIC ACID SCH: 5 INJECTION, SOLUTION INTRAMUSCULAR; INTRAVENOUS; SUBCUTANEOUS at 09:30

## 2019-02-05 RX ADMIN — FOLIC ACID SCH MLS/HR: 5 INJECTION, SOLUTION INTRAMUSCULAR; INTRAVENOUS; SUBCUTANEOUS at 11:47

## 2019-02-05 RX ADMIN — CILOSTAZOL SCH MG: 100 TABLET ORAL at 21:21

## 2019-02-05 RX ADMIN — Medication SCH MG: at 17:33

## 2019-02-06 LAB
ALBUMIN SERPL-MCNC: 2.6 G/DL (ref 3.5–5)
ALBUMIN/GLOB SERPL: 0.5 {RATIO} (ref 1–2.1)
ALT SERPL-CCNC: 13 U/L (ref 21–72)
AST SERPL-CCNC: 48 U/L (ref 17–59)
BASOPHILS # BLD AUTO: 0 K/UL (ref 0–0.2)
BASOPHILS NFR BLD: 0.4 % (ref 0–2)
BUN SERPL-MCNC: 10 MG/DL (ref 9–20)
CALCIUM SERPL-MCNC: 8.1 MG/DL (ref 8.6–10.4)
EOSINOPHIL # BLD AUTO: 0.1 K/UL (ref 0–0.7)
EOSINOPHIL NFR BLD: 3.1 % (ref 0–4)
ERYTHROCYTE [DISTWIDTH] IN BLOOD BY AUTOMATED COUNT: 22.8 % (ref 11.5–14.5)
GFR NON-AFRICAN AMERICAN: > 60
HGB BLD-MCNC: 10.1 G/DL (ref 12–18)
LYMPHOCYTES # BLD AUTO: 1.3 K/UL (ref 1–4.3)
LYMPHOCYTES NFR BLD AUTO: 33.3 % (ref 20–40)
MCH RBC QN AUTO: 31.1 PG (ref 27–31)
MCHC RBC AUTO-ENTMCNC: 34.1 G/DL (ref 33–37)
MCV RBC AUTO: 91.3 FL (ref 80–94)
MONOCYTES # BLD: 0.6 K/UL (ref 0–0.8)
MONOCYTES NFR BLD: 16.1 % (ref 0–10)
NEUTROPHILS # BLD: 1.9 K/UL (ref 1.8–7)
NEUTROPHILS NFR BLD AUTO: 47.1 % (ref 50–75)
NRBC BLD AUTO-RTO: 0 % (ref 0–2)
PLATELET # BLD: 194 K/UL (ref 130–400)
PMV BLD AUTO: 8 FL (ref 7.2–11.7)
RBC # BLD AUTO: 3.25 MIL/UL (ref 4.4–5.9)
WBC # BLD AUTO: 3.9 K/UL (ref 4.8–10.8)

## 2019-02-06 RX ADMIN — FOLIC ACID SCH MLS/HR: 5 INJECTION, SOLUTION INTRAMUSCULAR; INTRAVENOUS; SUBCUTANEOUS at 10:45

## 2019-02-06 RX ADMIN — CILOSTAZOL SCH MG: 100 TABLET ORAL at 22:14

## 2019-02-06 RX ADMIN — PANTOPRAZOLE SODIUM SCH MG: 40 TABLET, DELAYED RELEASE ORAL at 10:46

## 2019-02-06 RX ADMIN — CILOSTAZOL SCH MG: 100 TABLET ORAL at 10:46

## 2019-02-06 RX ADMIN — Medication SCH MG: at 15:01

## 2019-02-06 RX ADMIN — MAGNESIUM SULFATE IN DEXTROSE SCH MLS/HR: 10 INJECTION, SOLUTION INTRAVENOUS at 04:52

## 2019-02-06 RX ADMIN — POTASSIUM CHLORIDE SCH MEQ: 20 TABLET, EXTENDED RELEASE ORAL at 10:47

## 2019-02-06 RX ADMIN — Medication SCH MG: at 17:52

## 2019-02-06 RX ADMIN — MAGNESIUM SULFATE IN DEXTROSE SCH MLS/HR: 10 INJECTION, SOLUTION INTRAVENOUS at 00:10

## 2019-02-06 RX ADMIN — Medication SCH MG: at 10:46

## 2019-02-07 VITALS
HEART RATE: 81 BPM | RESPIRATION RATE: 19 BRPM | TEMPERATURE: 97.2 F | OXYGEN SATURATION: 98 % | DIASTOLIC BLOOD PRESSURE: 95 MMHG | SYSTOLIC BLOOD PRESSURE: 128 MMHG

## 2019-02-07 RX ADMIN — CILOSTAZOL SCH MG: 100 TABLET ORAL at 08:40

## 2019-02-07 RX ADMIN — Medication SCH MG: at 08:38

## 2019-02-07 RX ADMIN — PANTOPRAZOLE SODIUM SCH MG: 40 TABLET, DELAYED RELEASE ORAL at 08:39

## 2019-02-07 RX ADMIN — POTASSIUM CHLORIDE SCH MEQ: 20 TABLET, EXTENDED RELEASE ORAL at 08:39

## 2019-02-24 ENCOUNTER — HOSPITAL ENCOUNTER (EMERGENCY)
Dept: HOSPITAL 31 - C.ER | Age: 49
Discharge: HOME | End: 2019-02-24
Payer: MEDICARE

## 2019-02-24 VITALS
DIASTOLIC BLOOD PRESSURE: 82 MMHG | TEMPERATURE: 98.2 F | RESPIRATION RATE: 16 BRPM | SYSTOLIC BLOOD PRESSURE: 116 MMHG | HEART RATE: 76 BPM | OXYGEN SATURATION: 98 %

## 2019-02-24 VITALS — BODY MASS INDEX: 48 KG/M2

## 2019-02-24 DIAGNOSIS — R10.9: Primary | ICD-10-CM

## 2019-02-24 LAB
ALBUMIN SERPL-MCNC: 2.5 G/DL (ref 3.5–5)
ALBUMIN/GLOB SERPL: 0.5 {RATIO} (ref 1–2.1)
ALT SERPL-CCNC: 21 U/L (ref 21–72)
AST SERPL-CCNC: 45 U/L (ref 17–59)
BACTERIA #/AREA URNS HPF: (no result) /[HPF]
BASOPHILS # BLD AUTO: 0 K/UL (ref 0–0.2)
BASOPHILS NFR BLD: 0.8 % (ref 0–2)
BILIRUB UR-MCNC: NEGATIVE MG/DL
BUN SERPL-MCNC: 8 MG/DL (ref 9–20)
CALCIUM SERPL-MCNC: 7.9 MG/DL (ref 8.6–10.4)
EOSINOPHIL # BLD AUTO: 0.1 K/UL (ref 0–0.7)
EOSINOPHIL NFR BLD: 4.9 % (ref 0–4)
ERYTHROCYTE [DISTWIDTH] IN BLOOD BY AUTOMATED COUNT: 21.8 % (ref 11.5–14.5)
GFR NON-AFRICAN AMERICAN: > 60
GLUCOSE UR STRIP-MCNC: NORMAL MG/DL
HGB BLD-MCNC: 10.1 G/DL (ref 12–18)
LEUKOCYTE ESTERASE UR-ACNC: (no result) LEU/UL
LIPASE: 156 U/L (ref 23–300)
LYMPHOCYTES # BLD AUTO: 1.1 K/UL (ref 1–4.3)
LYMPHOCYTES NFR BLD AUTO: 40 % (ref 20–40)
MCH RBC QN AUTO: 30.7 PG (ref 27–31)
MCHC RBC AUTO-ENTMCNC: 32.7 G/DL (ref 33–37)
MCV RBC AUTO: 93.7 FL (ref 80–94)
MONOCYTES # BLD: 0.3 K/UL (ref 0–0.8)
MONOCYTES NFR BLD: 12.4 % (ref 0–10)
NEUTROPHILS # BLD: 1.2 K/UL (ref 1.8–7)
NEUTROPHILS NFR BLD AUTO: 41.9 % (ref 50–75)
NRBC BLD AUTO-RTO: 0.1 % (ref 0–2)
PH UR STRIP: 7 [PH] (ref 5–8)
PLATELET # BLD: 134 K/UL (ref 130–400)
PMV BLD AUTO: 7.1 FL (ref 7.2–11.7)
PROT UR STRIP-MCNC: NEGATIVE MG/DL
RBC # BLD AUTO: 3.28 MIL/UL (ref 4.4–5.9)
RBC # UR STRIP: NEGATIVE /UL
SP GR UR STRIP: 1.01 (ref 1–1.03)
SQUAMOUS EPITHIAL: 1 /HPF (ref 0–5)
UROBILINOGEN UR-MCNC: 2 MG/DL (ref 0.2–1)
WBC # BLD AUTO: 2.8 K/UL (ref 4.8–10.8)

## 2019-02-24 PROCEDURE — 80053 COMPREHEN METABOLIC PANEL: CPT

## 2019-02-24 PROCEDURE — 81001 URINALYSIS AUTO W/SCOPE: CPT

## 2019-02-24 PROCEDURE — 74176 CT ABD & PELVIS W/O CONTRAST: CPT

## 2019-02-24 PROCEDURE — 85025 COMPLETE CBC W/AUTO DIFF WBC: CPT

## 2019-02-24 PROCEDURE — 87086 URINE CULTURE/COLONY COUNT: CPT

## 2019-02-24 PROCEDURE — 96361 HYDRATE IV INFUSION ADD-ON: CPT

## 2019-02-24 PROCEDURE — 96374 THER/PROPH/DIAG INJ IV PUSH: CPT

## 2019-02-24 PROCEDURE — 99284 EMERGENCY DEPT VISIT MOD MDM: CPT

## 2019-02-24 PROCEDURE — 96375 TX/PRO/DX INJ NEW DRUG ADDON: CPT

## 2019-02-24 PROCEDURE — 83690 ASSAY OF LIPASE: CPT

## 2019-02-25 ENCOUNTER — HOSPITAL ENCOUNTER (EMERGENCY)
Dept: HOSPITAL 31 - C.ER | Age: 49
Discharge: HOME | End: 2019-02-25
Payer: MEDICARE

## 2019-02-25 VITALS — RESPIRATION RATE: 18 BRPM

## 2019-02-25 VITALS — HEART RATE: 95 BPM | TEMPERATURE: 99.3 F | DIASTOLIC BLOOD PRESSURE: 76 MMHG | SYSTOLIC BLOOD PRESSURE: 125 MMHG

## 2019-02-25 VITALS — OXYGEN SATURATION: 100 %

## 2019-02-25 VITALS — BODY MASS INDEX: 48 KG/M2

## 2019-02-25 DIAGNOSIS — M54.40: Primary | ICD-10-CM

## 2019-03-02 ENCOUNTER — HOSPITAL ENCOUNTER (EMERGENCY)
Dept: HOSPITAL 14 - H.ER | Age: 49
LOS: 1 days | Discharge: HOME | End: 2019-03-03
Payer: MEDICARE

## 2019-03-02 VITALS — BODY MASS INDEX: 48 KG/M2

## 2019-03-02 DIAGNOSIS — J44.9: ICD-10-CM

## 2019-03-02 DIAGNOSIS — Z86.73: ICD-10-CM

## 2019-03-02 DIAGNOSIS — R60.0: ICD-10-CM

## 2019-03-02 DIAGNOSIS — Z79.899: ICD-10-CM

## 2019-03-02 DIAGNOSIS — M54.30: Primary | ICD-10-CM

## 2019-03-02 LAB
BASOPHILS # BLD AUTO: 0 K/UL (ref 0–0.2)
BASOPHILS NFR BLD: 1 % (ref 0–2)
BILIRUB UR-MCNC: NEGATIVE MG/DL
COLOR UR: COLORLESS
EOSINOPHIL # BLD AUTO: 0.2 K/UL (ref 0–0.7)
EOSINOPHIL NFR BLD: 3.9 % (ref 0–4)
ERYTHROCYTE [DISTWIDTH] IN BLOOD BY AUTOMATED COUNT: 20.7 % (ref 11.5–14.5)
GLUCOSE UR STRIP-MCNC: (no result) MG/DL
HGB BLD-MCNC: 12.2 G/DL (ref 12–18)
LEUKOCYTE ESTERASE UR-ACNC: (no result) LEU/UL
LYMPHOCYTES # BLD AUTO: 2.1 K/UL (ref 1–4.3)
LYMPHOCYTES NFR BLD AUTO: 47 % (ref 20–40)
MCH RBC QN AUTO: 31 PG (ref 27–31)
MCHC RBC AUTO-ENTMCNC: 33.3 G/DL (ref 33–37)
MCV RBC AUTO: 93.2 FL (ref 80–94)
MONOCYTES # BLD: 0.5 K/UL (ref 0–0.8)
MONOCYTES NFR BLD: 12.4 % (ref 0–10)
NEUTROPHILS # BLD: 1.6 K/UL (ref 1.8–7)
NEUTROPHILS NFR BLD AUTO: 35.7 % (ref 50–75)
NRBC BLD AUTO-RTO: 0.2 % (ref 0–0)
PH UR STRIP: 7 [PH] (ref 5–8)
PLATELET # BLD: 168 K/UL (ref 130–400)
PMV BLD AUTO: 7.3 FL (ref 7.2–11.7)
PROT UR STRIP-MCNC: NEGATIVE MG/DL
RBC # BLD AUTO: 3.95 MIL/UL (ref 4.4–5.9)
RBC # UR STRIP: NEGATIVE /UL
SP GR UR STRIP: < 1.005 (ref 1–1.03)
SQUAMOUS EPITHIAL: < 1 /HPF (ref 0–5)
URINE CLARITY: CLEAR
UROBILINOGEN UR-MCNC: (no result) MG/DL (ref 0.2–1)
WBC # BLD AUTO: 4.4 K/UL (ref 4.8–10.8)

## 2019-03-02 PROCEDURE — 93005 ELECTROCARDIOGRAM TRACING: CPT

## 2019-03-02 PROCEDURE — 83880 ASSAY OF NATRIURETIC PEPTIDE: CPT

## 2019-03-02 PROCEDURE — 71046 X-RAY EXAM CHEST 2 VIEWS: CPT

## 2019-03-02 PROCEDURE — 96374 THER/PROPH/DIAG INJ IV PUSH: CPT

## 2019-03-02 PROCEDURE — 82140 ASSAY OF AMMONIA: CPT

## 2019-03-02 PROCEDURE — 80053 COMPREHEN METABOLIC PANEL: CPT

## 2019-03-02 PROCEDURE — 84484 ASSAY OF TROPONIN QUANT: CPT

## 2019-03-02 PROCEDURE — 85025 COMPLETE CBC W/AUTO DIFF WBC: CPT

## 2019-03-02 PROCEDURE — 99285 EMERGENCY DEPT VISIT HI MDM: CPT

## 2019-03-02 PROCEDURE — 81003 URINALYSIS AUTO W/O SCOPE: CPT

## 2019-03-03 ENCOUNTER — HOSPITAL ENCOUNTER (EMERGENCY)
Dept: HOSPITAL 14 - H.ER | Age: 49
LOS: 1 days | Discharge: HOME | End: 2019-03-04
Payer: MEDICARE

## 2019-03-03 VITALS
SYSTOLIC BLOOD PRESSURE: 118 MMHG | DIASTOLIC BLOOD PRESSURE: 66 MMHG | RESPIRATION RATE: 17 BRPM | TEMPERATURE: 98.1 F | HEART RATE: 98 BPM | OXYGEN SATURATION: 100 %

## 2019-03-03 VITALS
OXYGEN SATURATION: 100 % | TEMPERATURE: 97.1 F | RESPIRATION RATE: 18 BRPM | SYSTOLIC BLOOD PRESSURE: 117 MMHG | DIASTOLIC BLOOD PRESSURE: 78 MMHG | HEART RATE: 93 BPM

## 2019-03-03 VITALS — BODY MASS INDEX: 48 KG/M2

## 2019-03-03 DIAGNOSIS — Z86.59: ICD-10-CM

## 2019-03-03 DIAGNOSIS — I11.0: ICD-10-CM

## 2019-03-03 DIAGNOSIS — E11.9: ICD-10-CM

## 2019-03-03 DIAGNOSIS — Z86.73: ICD-10-CM

## 2019-03-03 DIAGNOSIS — Z79.899: ICD-10-CM

## 2019-03-03 DIAGNOSIS — J44.9: ICD-10-CM

## 2019-03-03 DIAGNOSIS — G89.29: Primary | ICD-10-CM

## 2019-03-03 LAB
ALBUMIN SERPL-MCNC: 3.2 G/DL (ref 3.5–5)
ALBUMIN/GLOB SERPL: 0.6 {RATIO} (ref 1–2.1)
ALT SERPL-CCNC: 29 U/L (ref 21–72)
AST SERPL-CCNC: 66 U/L (ref 17–59)
BNP SERPL-MCNC: 182 PG/ML (ref 0–450)
BUN SERPL-MCNC: 5 MG/DL (ref 9–20)
CALCIUM SERPL-MCNC: 8.5 MG/DL (ref 8.4–10.2)
GFR NON-AFRICAN AMERICAN: > 60

## 2019-03-03 NOTE — ED PDOC
Lower Extremity Pain/Injury


Time Seen by Provider: 03/02/19 22:54


Chief Complaint (Nursing): Lower Extremity Problem/Injury


Chief Complaint (Provider): Lower Extremity Edema


History Per: Patient


History/Exam Limitations: no limitations


Onset/Duration Of Symptoms: Days (chronic)


Additional Complaint(s): 





Pt presents to the ED comlaining of lower extremity edema and pain; pt has noted

discoloration of his lower legs as well as venous statis; pt has a history of 

cirrohis, D2M, HTN and CHF. His chart indicates that he has been seen in various

hosptial emergency rooms in the area and treated for pain over the last few 

days; pt indicates that he hs not followed up nor filled his prescriptions





Past Medical History


Reviewed: Historical Data, Nursing Documentation, Vital Signs


Vital Signs: 





                                Last Vital Signs











Temp  98.4 F   03/02/19 22:46


 


Pulse  110 H  03/02/19 22:46


 


Resp  18   03/02/19 22:46


 


BP  132/75   03/02/19 22:46


 


Pulse Ox  98   03/02/19 22:46














- Medical History


PMH: Anemia, Anxiety, Arthritis (KNEE), Asthma, Atrial Fibrillation, Cardia Arrh

ythmia, CHF, COPD, Gall Bladder Disease, HTN, Pancreatitis, Pneumonia, 

Schizophrenia, TIA


   Denies: Bipolar Disorder, Depression, Diabetes, Hepatitis, HIV, Chronic 

Kidney Disease, Seizures, Sexually Transmitted Disease





- Surgical History


Surgical History: Appendectomy, Cholecystectomy


   Denies: CABG, Coronary Stent, Pacemaker, Tonsillectomy





- Family History


Family History: States: Unknown Family Hx





- Immunization History


Hx Tetanus Toxoid Vaccination: No


Hx Influenza Vaccination: No


Hx Pneumococcal Vaccination: No





- Home Medications


Home Medications: 


                                Ambulatory Orders











 Medication  Instructions  Recorded


 


Folic Acid 1 mg PO DAILY #0 tab 03/25/16


 


Gabapentin [Neurontin] 400 mg PO TID 07/12/18


 


Cilostazol [Pletal] 100 mg PO Q12 07/26/18


 


rifAXIMin [Xifaxan] 550 mg PO Q12 07/26/18


 


Potassium Chloride [K-Tab ER] 20 meq PO DAILY #7 tablet.er 07/30/18


 


Lactulose [Enulose] 20 gm PO BID 30 Days  udc 11/29/18


 


Magnesium Oxide [Mag-Ox] 800 mg PO TID #40 tab 01/25/19


 


Spironolactone [Aldactone] 50 mg PO DAILY #30 tab 01/25/19


 


levoFLOXacin [Levaquin] 1 tab PO DAILY #7 tab 02/24/19


 


Cyclobenzaprine [Cyclobenzaprine 10 mg PO TID #15 tab 02/25/19





HCl]  


 


Ibuprofen [Motrin] 600 mg PO TID #15 tab 02/25/19


 


Ibuprofen [Motrin] 1 tab PO QID #30 tab 03/03/19














- Allergies


Allergies/Adverse Reactions: 


                                    Allergies











Allergy/AdvReac Type Severity Reaction Status Date / Time


 


No Known Allergies Allergy   Verified 03/02/19 22:44














Review of Systems


ROS Statement: Except As Marked, All Systems Reviewed And Found Negative


Cardiovascular: Positive for: Edema


Respiratory: Negative for: Cough


Musculoskeletal: Positive for: Leg Pain


Skin: Positive for: Rash





Physical Exam





- Reviewed


Nursing Documentation Reviewed: Yes


Vital Signs Reviewed: Yes





- Physical Exam


Appears: Positive for: No Acute Distress, Uncomfortable


Head Exam: Positive for: ATRAUMATIC, NORMAL INSPECTION


Skin: Positive for: Warm, Dry, Rash.  Negative for: Normal Color, Diaphoresis, 

Pallor


Eye Exam: Positive for: Normal appearance.  Negative for: Nystagmus, Periorbital

 swelling, Periorbital tenderness


Cardiovascular/Chest: Positive for: Regular Rate, Rhythm


Respiratory: Positive for: Normal Breath Sounds.  Negative for: Stridor, 

Wheezing, Respiratory Distress


Pulses-Carotid (L): 2+


Pulses-Carotid (R): 2+


Pulses-Radial (L): 2+


Pulses-Radial (R): 2+


Extremity: Positive for: Tenderness, Pedal Edema, Capillary Refill.  Negative 

for: Deformity





- Laboratory Results


Result Diagrams: 


                                 03/02/19 23:25





                                 03/02/19 23:25


Lab Results: 





                                        











Troponin I  0.0160 ng/mL (0.00-0.120)   03/02/19  23:25    








                                        











NT-Pro-B Natriuret Pep  182 pg/ml (0-450)   03/02/19  23:25    








                                        











Total Bilirubin  1.3 mg/dl (0.2-1.3)   03/02/19  23:25    


 


AST  66 U/L (17-59)  H D 03/02/19  23:25    


 


ALT  29 U/L (21-72)   03/02/19  23:25    


 


Alkaline Phosphatase  164 U/L ()  H  03/02/19  23:25    


 


Total Protein  9.1 G/DL (6.3-8.2)  H  03/02/19  23:25    


 


Albumin  3.2 g/dL (3.5-5.0)  L  03/02/19  23:25    


 


Globulin  5.8 gm/dL (2.2-3.9)  H  03/02/19  23:25    


 


Albumin/Globulin Ratio  0.6  (1.0-2.1)  L  03/02/19  23:25    








                                        











Urine Color  Colorless  (YELLOW)   03/02/19  23:20    


 


Urine Clarity  Clear  (Clear)   03/02/19  23:20    


 


Urine pH  7.0  (5.0-8.0)   03/02/19  23:20    


 


Ur Specific Gravity  < 1.005  (1.003-1.030)   03/02/19  23:20    


 


Urine Protein  Negative mg/dL (NEGATIVE)   03/02/19  23:20    


 


Urine Glucose (UA)  Neg mg/dL (NEGATIVE)   03/02/19  23:20    


 


Urine Ketones  Negative mg/dL (NEGATIVE)   03/02/19  23:20    


 


Urine Blood  Negative  (NEGATIVE)   03/02/19  23:20    


 


Urine Nitrate  Negative  (NEGATIVE)   03/02/19  23:20    


 


Urine Bilirubin  Negative  (NEGATIVE)   03/02/19  23:20    


 


Urine Urobilinogen  0.2-1.0 mg/dL (0.2-1.0)   03/02/19  23:20    


 


Ur Leukocyte Esterase  Neg Chalino/uL (Negative)   03/02/19  23:20    


 


Urine RBC (Auto)  < 1 /hpf (0-3)   03/02/19  23:20    


 


Urine Microscopic WBC  < 1 /hpf (0-5)   03/02/19  23:20    


 


Ur Squamous Epith Cells  < 1 /hpf (0-5)   03/02/19  23:20    














- ECG


O2 Sat by Pulse Oximetry: 98





Medical Decision Making


Medical Decision Making: 





I: rule out hyperammonia/CHF


P: labs CBC CMP BNP UA





Disposition





- Clinical Impression


Clinical Impression: 


 Edema, Sciatica








- Patient ED Disposition


Is Patient to be Admitted: No


Doctor Will See Patient In The: Office


Counseled Patient/Family Regarding: Diagnosis, Need For Followup





- Disposition


Referrals: 


Irlanda Parra MD [Medical Doctor] - 


Disposition: Routine/Home


Disposition Time: 01:40


Condition: STABLE


Prescriptions: 


Ibuprofen [Motrin] 1 tab PO QID #30 tab


Instructions:  Sciatica (DC), Dependent Edema (DC), Swelling, Fluid Restricted 

Diet, Sciatica Exercises


Forms:  [employer] (English), CL[employer]VER (Italian)


Print Language: Nepali

## 2019-03-03 NOTE — RAD
Date of service: 



03/02/2019



HISTORY:

 sob/fermin 



COMPARISON:

Chest radiograph dated 09/30/2017.



TECHNIQUE:

Chest PA and lateral



FINDINGS:



LUNGS:

No active pulmonary disease.



PLEURA:

No significant pleural effusion identified. No pneumothorax apparent.



CARDIOVASCULAR:

No aortic atherosclerotic calcification present.  Cardiomediastinal 

silhouette stably enlarged. 



OSSEOUS STRUCTURES:

Unchanged.



VISUALIZED UPPER ABDOMEN:

Normal.



OTHER FINDINGS:

None.



IMPRESSION:

No active disease.

## 2019-03-03 NOTE — CARD
--------------- APPROVED REPORT --------------





Date of service: 03/02/2019



EKG Measurement

Heart [id number]

[id number]

[id number]

[id number]



<Conclusion>

Atrial fibrillation with rapid ventricular response

Nonspecific ST and T wave abnormality

Abnormal ECG

## 2019-03-04 ENCOUNTER — HOSPITAL ENCOUNTER (EMERGENCY)
Dept: HOSPITAL 31 - C.ER | Age: 49
Discharge: HOME | End: 2019-03-04
Payer: MEDICARE

## 2019-03-04 VITALS
OXYGEN SATURATION: 98 % | HEART RATE: 117 BPM | DIASTOLIC BLOOD PRESSURE: 79 MMHG | SYSTOLIC BLOOD PRESSURE: 126 MMHG | TEMPERATURE: 97.5 F

## 2019-03-04 VITALS — RESPIRATION RATE: 16 BRPM

## 2019-03-04 VITALS — BODY MASS INDEX: 48 KG/M2

## 2019-03-04 DIAGNOSIS — G89.29: Primary | ICD-10-CM

## 2019-03-04 DIAGNOSIS — M79.604: ICD-10-CM

## 2019-03-04 DIAGNOSIS — M79.605: ICD-10-CM

## 2019-03-04 DIAGNOSIS — Z59.0: ICD-10-CM

## 2019-03-04 SDOH — ECONOMIC STABILITY - HOUSING INSECURITY: HOMELESSNESS: Z59.0

## 2019-03-04 NOTE — ED PDOC
HPI: Psych/Substance Abuse


Time Seen by Provider: 03/03/19 21:47


Chief Complaint (Nursing): Alcohol Ingestion


Chief Complaint (Provider): Chronic Pain, Bed Seeking Behavior


ED Caveat: Uncooperative


History Per: Patient


History/Exam Limitations: no limitations


Onset/Duration Of Symptoms: Days (chronic pain)


Current Symptoms Are (Timing): Constant (Pt presents to the ED complaining of 

"pain" after having been discharged with an NSAID rx earlier today. The patient 

denies any truama or any changes in his condition, but does state that his 

sister has asked him to leave the house and he is desireous of a place to stay. 

The patient has a PMHx that includes alcohol abuse (current), cirrohis 

(current), HTN and D2M.)





Past Medical History


Reviewed: Historical Data, Nursing Documentation, Vital Signs


Vital Signs: 





                                Last Vital Signs











Temp  97.1 F L  03/03/19 21:33


 


Pulse  93 H  03/03/19 21:33


 


Resp  18   03/03/19 21:33


 


BP  117/78   03/03/19 21:33


 


Pulse Ox  100   03/03/19 21:33














- Medical History


PMH: Anemia, Anxiety, Arthritis (KNEE), Asthma, Atrial Fibrillation, Cardia 

Arrhythmia, CHF, COPD, Gall Bladder Disease, HTN, Pancreatitis, Pneumonia, 

Schizophrenia, TIA


   Denies: Bipolar Disorder, Depression, Diabetes, Hepatitis, HIV, Chronic 

Kidney Disease, Seizures, Sexually Transmitted Disease





- Surgical History


Surgical History: Appendectomy, Cholecystectomy


   Denies: CABG, Coronary Stent, Pacemaker, Tonsillectomy





- Family History


Family History: States: Unknown Family Hx





- Immunization History


Hx Tetanus Toxoid Vaccination: No


Hx Influenza Vaccination: No


Hx Pneumococcal Vaccination: No





- Home Medications


Home Medications: 


                                Ambulatory Orders











 Medication  Instructions  Recorded


 


Folic Acid 1 mg PO DAILY #0 tab 03/25/16


 


Gabapentin [Neurontin] 400 mg PO TID 07/12/18


 


Cilostazol [Pletal] 100 mg PO Q12 07/26/18


 


rifAXIMin [Xifaxan] 550 mg PO Q12 07/26/18


 


Potassium Chloride [K-Tab ER] 20 meq PO DAILY #7 tablet.er 07/30/18


 


Lactulose [Enulose] 20 gm PO BID 30 Days  udc 11/29/18


 


Magnesium Oxide [Mag-Ox] 800 mg PO TID #40 tab 01/25/19


 


Spironolactone [Aldactone] 50 mg PO DAILY #30 tab 01/25/19


 


levoFLOXacin [Levaquin] 1 tab PO DAILY #7 tab 02/24/19


 


Cyclobenzaprine [Cyclobenzaprine 10 mg PO TID #15 tab 02/25/19





HCl]  


 


Ibuprofen [Motrin] 600 mg PO TID #15 tab 02/25/19


 


Ibuprofen [Motrin] 1 tab PO QID #30 tab 03/03/19














- Allergies


Allergies/Adverse Reactions: 


                                    Allergies











Allergy/AdvReac Type Severity Reaction Status Date / Time


 


No Known Allergies Allergy   Verified 03/02/19 22:44














Review of Systems


ROS Statement: Except As Marked, All Systems Reviewed And Found Negative


Musculoskeletal: Positive for: Other (pain)





Physical Exam





- Reviewed


Nursing Documentation Reviewed: Yes


Vital Signs Reviewed: Yes





- Physical Exam


Appears: Positive for: Well, Non-toxic, No Acute Distress, Uncomfortable


Head Exam: Positive for: ATRAUMATIC, NORMAL INSPECTION


Skin: Positive for: Normal Color, Warm, Dry.  Negative for: Diaphoresis, Pallor,

 Rash


Eye Exam: Positive for: Normal appearance.  Negative for: Nystagmus, Periorbital

 swelling, Periorbital tenderness


Neck: Positive for: Normal, Painless ROM, Supple.  Negative for: Decreased ROM


Cardiovascular/Chest: Positive for: Regular Rate, Rhythm


Respiratory: Positive for: Normal Breath Sounds


Extremity: Positive for: Tenderness, Pedal Edema, Swelling (The patient has 

chronic dry venous statis of the bilateral lower extremities)





- ECG


O2 Sat by Pulse Oximetry: 100





Medical Decision Making


Medical Decision Making: 





I: chronic pain seeking narcotic relief


P: advise to fill rx given earlier in day; discharge


The patient is stable for discharge


The patient is safe for discharge





Disposition





- Clinical Impression


Clinical Impression: 


 Chronic pain





Counseled Patient/Family Regarding: Diagnosis





- Disposition


Disposition: Routine/Home


Disposition Time: 00:05


Condition: STABLE


Instructions:  Chronic Pain (DC)


Forms:  [employer] (English), [employer] (Croatian)


Print Language: Icelandic

## 2019-03-05 ENCOUNTER — HOSPITAL ENCOUNTER (EMERGENCY)
Dept: HOSPITAL 31 - C.ER | Age: 49
LOS: 1 days | Discharge: HOME | End: 2019-03-06
Payer: MEDICARE

## 2019-03-05 VITALS — BODY MASS INDEX: 48 KG/M2

## 2019-03-05 DIAGNOSIS — F10.129: Primary | ICD-10-CM

## 2019-03-05 DIAGNOSIS — I10: ICD-10-CM

## 2019-03-05 DIAGNOSIS — I48.91: ICD-10-CM

## 2019-03-05 DIAGNOSIS — J44.9: ICD-10-CM

## 2019-03-05 DIAGNOSIS — Z86.73: ICD-10-CM

## 2019-03-05 DIAGNOSIS — I50.9: ICD-10-CM

## 2019-03-05 DIAGNOSIS — F20.9: ICD-10-CM

## 2019-03-06 VITALS — SYSTOLIC BLOOD PRESSURE: 142 MMHG | DIASTOLIC BLOOD PRESSURE: 72 MMHG | HEART RATE: 82 BPM | RESPIRATION RATE: 20 BRPM

## 2019-03-06 VITALS — TEMPERATURE: 98 F | OXYGEN SATURATION: 97 %

## 2019-03-08 ENCOUNTER — HOSPITAL ENCOUNTER (EMERGENCY)
Dept: HOSPITAL 31 - C.ER | Age: 49
End: 2019-03-08
Payer: MEDICARE

## 2019-03-15 ENCOUNTER — HOSPITAL ENCOUNTER (EMERGENCY)
Dept: HOSPITAL 14 - H.ER | Age: 49
LOS: 1 days | Discharge: HOME | End: 2019-03-16
Payer: MEDICARE

## 2019-03-15 ENCOUNTER — HOSPITAL ENCOUNTER (EMERGENCY)
Dept: HOSPITAL 14 - H.ER | Age: 49
Discharge: HOME | End: 2019-03-15
Payer: MEDICARE

## 2019-03-15 VITALS
SYSTOLIC BLOOD PRESSURE: 114 MMHG | OXYGEN SATURATION: 97 % | HEART RATE: 68 BPM | DIASTOLIC BLOOD PRESSURE: 84 MMHG | TEMPERATURE: 98.2 F | RESPIRATION RATE: 15 BRPM

## 2019-03-15 VITALS
OXYGEN SATURATION: 97 % | DIASTOLIC BLOOD PRESSURE: 76 MMHG | RESPIRATION RATE: 16 BRPM | SYSTOLIC BLOOD PRESSURE: 128 MMHG | HEART RATE: 63 BPM | TEMPERATURE: 98.6 F

## 2019-03-15 VITALS — BODY MASS INDEX: 48 KG/M2

## 2019-03-15 DIAGNOSIS — G89.29: Primary | ICD-10-CM

## 2019-03-15 DIAGNOSIS — F10.129: Primary | ICD-10-CM

## 2019-03-15 LAB
ALBUMIN SERPL-MCNC: 3 G/DL (ref 3.5–5)
ALBUMIN/GLOB SERPL: 0.6 {RATIO} (ref 1–2.1)
ALT SERPL-CCNC: 40 U/L (ref 21–72)
AST SERPL-CCNC: 134 U/L (ref 17–59)
BASOPHILS # BLD AUTO: 0 K/UL (ref 0–0.2)
BASOPHILS NFR BLD: 0.5 % (ref 0–2)
BILIRUB UR-MCNC: NEGATIVE MG/DL
BUN SERPL-MCNC: 8 MG/DL (ref 9–20)
CALCIUM SERPL-MCNC: 8.4 MG/DL (ref 8.4–10.2)
COLOR UR: (no result)
EOSINOPHIL # BLD AUTO: 0.1 K/UL (ref 0–0.7)
EOSINOPHIL NFR BLD: 3.7 % (ref 0–4)
ERYTHROCYTE [DISTWIDTH] IN BLOOD BY AUTOMATED COUNT: 18.4 % (ref 11.5–14.5)
GFR NON-AFRICAN AMERICAN: > 60
GLUCOSE UR STRIP-MCNC: (no result) MG/DL
HGB BLD-MCNC: 11.6 G/DL (ref 12–18)
LEUKOCYTE ESTERASE UR-ACNC: (no result) LEU/UL
LYMPHOCYTES # BLD AUTO: 1.6 K/UL (ref 1–4.3)
LYMPHOCYTES NFR BLD AUTO: 43.8 % (ref 20–40)
MCH RBC QN AUTO: 29.8 PG (ref 27–31)
MCHC RBC AUTO-ENTMCNC: 32.2 G/DL (ref 33–37)
MCV RBC AUTO: 92.6 FL (ref 80–94)
MONOCYTES # BLD: 0.9 K/UL (ref 0–0.8)
MONOCYTES NFR BLD: 23.6 % (ref 0–10)
NEUTROPHILS # BLD: 1.1 K/UL (ref 1.8–7)
NEUTROPHILS NFR BLD AUTO: 28.4 % (ref 50–75)
NRBC BLD AUTO-RTO: 0.2 % (ref 0–0)
PH UR STRIP: 7 [PH] (ref 5–8)
PLATELET # BLD: 126 K/UL (ref 130–400)
PMV BLD AUTO: 7.6 FL (ref 7.2–11.7)
PROT UR STRIP-MCNC: NEGATIVE MG/DL
RBC # BLD AUTO: 3.89 MIL/UL (ref 4.4–5.9)
RBC # UR STRIP: NEGATIVE /UL
SP GR UR STRIP: < 1.005 (ref 1–1.03)
SQUAMOUS EPITHIAL: < 1 /HPF (ref 0–5)
URINE CLARITY: CLEAR
UROBILINOGEN UR-MCNC: (no result) MG/DL (ref 0.2–1)
WBC # BLD AUTO: 3.7 K/UL (ref 4.8–10.8)

## 2019-03-15 PROCEDURE — 99284 EMERGENCY DEPT VISIT MOD MDM: CPT

## 2019-03-15 PROCEDURE — 80053 COMPREHEN METABOLIC PANEL: CPT

## 2019-03-15 PROCEDURE — 96372 THER/PROPH/DIAG INJ SC/IM: CPT

## 2019-03-15 PROCEDURE — 81003 URINALYSIS AUTO W/O SCOPE: CPT

## 2019-03-15 PROCEDURE — 85025 COMPLETE CBC W/AUTO DIFF WBC: CPT

## 2019-03-15 PROCEDURE — 99283 EMERGENCY DEPT VISIT LOW MDM: CPT

## 2019-03-15 NOTE — ED PDOC
HPI: Back


Time Seen by Provider: 03/15/19 03:45


Chief Complaint (Nursing): Back Pain


Chief Complaint (Provider): back pain


History Per: Patient,  (marj #[id number])


Onset/Duration Of Symptoms: Days


Current Symptoms Are (Timing): Still Present


Additional Complaint(s): 





47 y/o male brought in by EMS for evaluation of acute on chronic back pain.  

Patient states he has been staying at the shelter and "just can't do it 

anymore".  Patient denies numbness/weakness of extremities, bowel/bladder 

incontinence.  





Past Medical History


Reviewed: Historical Data, Nursing Documentation, Vital Signs


Vital Signs: 


                                Last Vital Signs











Temp  98.0 F   03/15/19 02:55


 


Pulse  98 H  03/15/19 02:55


 


Resp  18   03/15/19 02:55


 


BP  103/72   03/15/19 02:55


 


Pulse Ox  100   03/15/19 02:55














- Medical History


PMH: Anemia, Anxiety, Arthritis (KNEE), Asthma, Atrial Fibrillation, Cardia 

Arrhythmia, CHF, COPD, Gall Bladder Disease, HTN, Pancreatitis, Pneumonia, 

Schizophrenia, TIA


   Denies: Bipolar Disorder, Depression, Diabetes, Hepatitis, HIV, Chronic 

Kidney Disease, Seizures, Sexually Transmitted Disease





- Surgical History


Surgical History: Appendectomy, Cholecystectomy


   Denies: CABG, Coronary Stent, Pacemaker, Tonsillectomy





- Family History


Family History: States: Unknown Family Hx





- Immunization History


Hx Tetanus Toxoid Vaccination: No


Hx Influenza Vaccination: No


Hx Pneumococcal Vaccination: No





- Home Medications


Home Medications: 


                                Ambulatory Orders











 Medication  Instructions  Recorded


 


Folic Acid 1 mg PO DAILY #0 tab 03/25/16


 


Gabapentin [Neurontin] 400 mg PO TID 07/12/18


 


Cilostazol [Pletal] 100 mg PO Q12 07/26/18


 


rifAXIMin [Xifaxan] 550 mg PO Q12 07/26/18


 


Potassium Chloride [K-Tab ER] 20 meq PO DAILY #7 tablet.er 07/30/18


 


Lactulose [Enulose] 20 gm PO BID 30 Days  udc 11/29/18


 


Magnesium Oxide [Mag-Ox] 800 mg PO TID #40 tab 01/25/19


 


Spironolactone [Aldactone] 50 mg PO DAILY #30 tab 01/25/19


 


levoFLOXacin [Levaquin] 1 tab PO DAILY #7 tab 02/24/19


 


Cyclobenzaprine [Cyclobenzaprine 10 mg PO TID #15 tab 02/25/19





HCl]  


 


Ibuprofen [Motrin] 600 mg PO TID #15 tab 02/25/19


 


Ibuprofen [Motrin] 600 mg PO Q6H #14 tab 03/08/19














- Allergies


Allergies/Adverse Reactions: 


                                    Allergies











Allergy/AdvReac Type Severity Reaction Status Date / Time


 


No Known Allergies Allergy   Verified 03/15/19 02:55














Review of Systems


ROS Statement: Except As Marked, All Systems Reviewed And Found Negative


Musculoskeletal: Positive for: Back Pain





Physical Exam





- Reviewed


Nursing Documentation Reviewed: Yes


Vital Signs Reviewed: Yes





- Physical Exam


Appears: Positive for: Well, Non-toxic, No Acute Distress


Head Exam: Positive for: ATRAUMATIC, NORMAL INSPECTION, NORMOCEPHALIC


Skin: Positive for: Normal Color


Cardiovascular/Chest: Positive for: Regular Rate, Rhythm


Respiratory: Positive for: Normal Breath Sounds


Back: Positive for: Normal Inspection, Muscle Spasm (bilateral lspine 

paravertebral tenderness).  Negative for: L CVA Tenderness, R CVA Tenderness, 

Vertebral Tenderness


Extremity: Positive for: Pedal Edema (bilateral), Swelling (bilateral lower 

extremity edema with chronic venous stasis skin changes; no warmth, erythema 

noted).  Negative for: Calf Tenderness


Neurological/Psych: Positive for: Awake, Alert, Oriented (x3)





- ECG


O2 Sat by Pulse Oximetry: 100





- Progress


ED Course And Treament: 





-Toradol IM





Patient sleeping throughout ED visit.


Patient requires no further intervention in the ED and is stable for discharge 

at this time





Patient was advised to follow up with PMD


Advised ibuprofen PRN pain


Return precautions given











Disposition





- Clinical Impression


Clinical Impression: 


 Chronic pain








- Patient ED Disposition


Is Patient to be Admitted: No


Counseled Patient/Family Regarding: Diagnosis, Need For Followup





- Disposition


Disposition: Routine/Home


Disposition Time: 04:27


Condition: IMPROVED


Instructions:  Low Back Pain in Adults


Print Language: Swiss

## 2019-03-15 NOTE — ED PDOC
HPI: Psych/Substance Abuse


Time Seen by Provider: 03/15/19 21:49


Chief Complaint (Nursing): Alcohol Ingestion


Chief Complaint (Provider): Alcohol Ingestion


History Per: EMS


History/Exam Limitations: no limitations


Onset/Duration Of Symptoms: Unknown


Current Symptoms Are (Timing): Still Present


Modifying Factor(s): Alcohol


Additional Complaint(s): 


47 y/o male brought in by EMS for public intoxication. Patient known to ED staff

and provider for frequent visits for public alcohol intoxication. On arrival, 

patient is agitated and uncooperative. On arrival, patient placed on 4 point 

restraints due to high risk for elopement and harm to self and ED staff. 








PMD: no provider





Past Medical History


Reviewed: Historical Data, Nursing Documentation, Vital Signs


Vital Signs: 





                                Last Vital Signs











Temp  98.6 F   03/15/19 21:32


 


Pulse  63   03/15/19 21:32


 


Resp  16   03/15/19 21:32


 


BP  128/76   03/15/19 21:32


 


Pulse Ox  97   03/15/19 21:32














- Medical History


PMH: Anemia, Anxiety, Arthritis (KNEE), Asthma, Atrial Fibrillation, Cardia 

Arrhythmia, CHF, COPD, Gall Bladder Disease, HTN, Pancreatitis, Pneumonia, 

Schizophrenia, TIA


   Denies: Bipolar Disorder, Depression, Diabetes, Hepatitis, HIV, Chronic 

Kidney Disease, Seizures, Sexually Transmitted Disease





- Surgical History


Surgical History: Appendectomy, Cholecystectomy


   Denies: CABG, Coronary Stent, Pacemaker, Tonsillectomy





- Family History


Family History: States: Unknown Family Hx





- Social History


Alcohol: > 2 Drinks/Day





- Immunization History


Hx Tetanus Toxoid Vaccination: No


Hx Influenza Vaccination: No


Hx Pneumococcal Vaccination: No





- Home Medications


Home Medications: 


                                Ambulatory Orders











 Medication  Instructions  Recorded


 


Folic Acid 1 mg PO DAILY #0 tab 03/25/16


 


Gabapentin [Neurontin] 400 mg PO TID 07/12/18


 


Cilostazol [Pletal] 100 mg PO Q12 07/26/18


 


rifAXIMin [Xifaxan] 550 mg PO Q12 07/26/18


 


Potassium Chloride [K-Tab ER] 20 meq PO DAILY #7 tablet.er 07/30/18


 


Lactulose [Enulose] 20 gm PO BID 30 Days  udc 11/29/18


 


Magnesium Oxide [Mag-Ox] 800 mg PO TID #40 tab 01/25/19


 


Spironolactone [Aldactone] 50 mg PO DAILY #30 tab 01/25/19


 


levoFLOXacin [Levaquin] 1 tab PO DAILY #7 tab 02/24/19


 


Cyclobenzaprine [Cyclobenzaprine 10 mg PO TID #15 tab 02/25/19





HCl]  


 


Ibuprofen [Motrin] 600 mg PO TID #15 tab 02/25/19


 


Ibuprofen [Motrin] 600 mg PO Q6H #14 tab 03/08/19














- Allergies


Allergies/Adverse Reactions: 


                                    Allergies











Allergy/AdvReac Type Severity Reaction Status Date / Time


 


No Known Allergies Allergy   Verified 03/15/19 21:33














Review of Systems


Review Of Systems: ROS cannot be obtained secondary to pt's inabilty to answer 

questions. (patient brought in by EMS for public intoxication)





Physical Exam





- Reviewed


Nursing Documentation Reviewed: Yes


Vital Signs Reviewed: Yes





- Physical Exam


Appears: Positive for: No Acute Distress


Head Exam: Positive for: ATRAUMATIC


Skin: Positive for: Normal Color


Eye Exam: Positive for: Normal appearance


Neck: Positive for: Normal, Painless ROM


Cardiovascular/Chest: Positive for: Regular Rate, Rhythm.  Negative for: Murmur,

 Bradycardia, Tachycardia


Respiratory: Positive for: Normal Breath Sounds.  Negative for: Respiratory 

Distress


Extremity: Positive for: Normal ROM


Neurological/Psych: Positive for: Awake, Alert





- Laboratory Results


Result Diagrams: 


                                 03/15/19 22:53





                                 03/15/19 22:53





- ECG


O2 Sat by Pulse Oximetry: 97 (RA)


Pulse Ox Interpretation: Normal





- Critical Care


Total Time (In Min): 30


Documented Critical Care: Time excludes all time spent performint seperately 

billable procedures





Medical Decision Making


Medical Decision Making: 


Time: 2150


Impression: Public Intoxication


Plan:


-- Alcohol Serum


-- CMP


-- Urine Drug Screen


-- ED Urine Dipstick


-- CBC with Differentials


-- Ativan 2 mg IVP


-- Haldol 5 mg IVP


-- 1:1 Observation


-- Restraints


-- Urinalysis








05:27


Patient is clinically sober and stable for discharge. Diagnosis is alcohol 

intoxication 


________________

________________________________________________________________________________


__________


Scribe Attestation:


Documented by Cory Jimenez, acting as a scribe Leyda Jones MD.





Provider Scribe Attestation:


All medical record entries made by the Scribe were at my direction and 

personally dictated by me. I have reviewed the chart and agree that the record 

accurately reflects my personal performance of the history, physical exam, 

medical decision making, and the department course for this patient. I have also

 personally directed, reviewed, and agree with the discharge instructions and 

disposition.





Disposition





- Clinical Impression


Clinical Impression: 


 Alcohol abuse with intoxication








- Patient ED Disposition


Is Patient to be Admitted: No





- Disposition


Disposition: Routine/Home


Disposition Time: 05:27


Condition: STABLE


Additional Instructions: 





HECTOR GALVAN, thank you for letting us take care of you today. Your provider 

was Alejandro Jones MD and you were treated for ETOH. The emergency medical 

care you received today was directed at your acute symptoms. If you were 

prescribed any medication, please fill it and take as directed. It may take 

several days for your symptoms to resolve. Return to the Emergency Department if

 your symptoms worsen, do not improve, or if you have any other problems.





Please contact your doctor or call one of the physicians/clinics you have been 

referred to that are listed on the Patient Visit Information form that is 

included in your discharge packet. Bring any paperwork you were given at 

discharge with you along with any medications you are taking to your follow up 

visit. Our treatment cannot replace ongoing medical care by a primary care 

provider outside of the emergency department.





Thank you for allowing the [facility] team to be part of your care today.








If you had an X-Ray or CT scan: A Radiologist will review the ED reading if any 

change in treatment is needed we will contact you.***





If you had a blood, urine, or wound culture: It will take several days for the 

results, if any change in treatment is needed we will contact you.***





If you had an STI test: It will take 48 hours for the results. Please call after

 1 week if you have not heard back.***


Instructions:  Alcohol Use - When Is Drinking a Problem?


Forms:  mydoodle[employer] (English)


Print Language: Hungarian

## 2019-03-16 LAB
ANISOCYTOSIS BLD QL SMEAR: SLIGHT
EOSINOPHIL NFR BLD: 3 % (ref 0–7)
HYPOCHROMIC: SLIGHT
LG PLATELETS BLD QL SMEAR: PRESENT
LYMPHOCYTE: 32 % (ref 20–50)
MONOCYTE: 15 % (ref 0–10)
MYELOCYTES NFR BLD: 1 % (ref 0–0)
NEUTROPHILS NFR BLD AUTO: 44 % (ref 42–75)
NEUTS BAND NFR BLD: 3 % (ref 0–2)
PLATELET # BLD EST: (no result) 10*3/UL
TEARDROP CELLS: SLIGHT
TOTAL CELLS COUNTED BLD: 100
VARIANT LYMPHS NFR BLD MANUAL: 2 % (ref 0–0)

## 2019-03-18 ENCOUNTER — HOSPITAL ENCOUNTER (INPATIENT)
Dept: HOSPITAL 31 - C.ER | Age: 49
LOS: 5 days | Discharge: HOME | DRG: 897 | End: 2019-03-23
Attending: INTERNAL MEDICINE | Admitting: INTERNAL MEDICINE
Payer: MEDICARE

## 2019-03-18 VITALS — BODY MASS INDEX: 48.9 KG/M2

## 2019-03-18 DIAGNOSIS — Z86.73: ICD-10-CM

## 2019-03-18 DIAGNOSIS — J44.9: ICD-10-CM

## 2019-03-18 DIAGNOSIS — F41.9: ICD-10-CM

## 2019-03-18 DIAGNOSIS — K70.40: ICD-10-CM

## 2019-03-18 DIAGNOSIS — I50.9: ICD-10-CM

## 2019-03-18 DIAGNOSIS — F10.129: Primary | ICD-10-CM

## 2019-03-18 DIAGNOSIS — I11.0: ICD-10-CM

## 2019-03-18 DIAGNOSIS — K76.6: ICD-10-CM

## 2019-03-18 DIAGNOSIS — F17.210: ICD-10-CM

## 2019-03-18 DIAGNOSIS — I85.00: ICD-10-CM

## 2019-03-18 DIAGNOSIS — K70.30: ICD-10-CM

## 2019-03-18 DIAGNOSIS — E66.01: ICD-10-CM

## 2019-03-18 DIAGNOSIS — I48.91: ICD-10-CM

## 2019-03-18 LAB
ALBUMIN SERPL-MCNC: 2.9 G/DL (ref 3.5–5)
ALBUMIN/GLOB SERPL: 0.6 {RATIO} (ref 1–2.1)
ALT SERPL-CCNC: 33 U/L (ref 21–72)
ANISOCYTOSIS BLD QL SMEAR: SLIGHT
APTT BLD: 32 SECONDS (ref 21–34)
AST SERPL-CCNC: 120 U/L (ref 17–59)
BASOPHILS # BLD AUTO: 0 K/UL (ref 0–0.2)
BASOPHILS NFR BLD: 0.8 % (ref 0–2)
BILIRUB UR-MCNC: NEGATIVE MG/DL
BNP SERPL-MCNC: 760 PG/ML (ref 0–450)
BUN SERPL-MCNC: 7 MG/DL (ref 9–20)
CALCIUM SERPL-MCNC: 8.7 MG/DL (ref 8.6–10.4)
CK MB SERPL-MCNC: 1.96 NG/ML (ref 0–3.38)
D DIMER: 626 NG/MLDDU (ref 0–243)
EOSINOPHIL # BLD AUTO: 0.1 K/UL (ref 0–0.7)
EOSINOPHIL NFR BLD: 2.3 % (ref 0–4)
EOSINOPHIL NFR BLD: 4 % (ref 0–4)
ERYTHROCYTE [DISTWIDTH] IN BLOOD BY AUTOMATED COUNT: 18.2 % (ref 11.5–14.5)
GFR NON-AFRICAN AMERICAN: > 60
GLUCOSE UR STRIP-MCNC: NORMAL MG/DL
HGB BLD-MCNC: 10.1 G/DL (ref 12–18)
INR PPP: 1.7
LEUKOCYTE ESTERASE UR-ACNC: (no result) LEU/UL
LYMPHOCYTE: 35 % (ref 20–40)
LYMPHOCYTES # BLD AUTO: 1 K/UL (ref 1–4.3)
LYMPHOCYTES NFR BLD AUTO: 36.5 % (ref 20–40)
MCH RBC QN AUTO: 31.2 PG (ref 27–31)
MCHC RBC AUTO-ENTMCNC: 33.6 G/DL (ref 33–37)
MCV RBC AUTO: 92.9 FL (ref 80–94)
MONOCYTE: 24 % (ref 0–10)
MONOCYTES # BLD: 0.8 K/UL (ref 0–0.8)
MONOCYTES NFR BLD: 29.2 % (ref 0–10)
NEUTROPHILS # BLD: 0.8 K/UL (ref 1.8–7)
NEUTROPHILS NFR BLD AUTO: 31.2 % (ref 50–75)
NEUTROPHILS NFR BLD AUTO: 35 % (ref 50–75)
NEUTS BAND NFR BLD: 2 % (ref 0–2)
NRBC BLD AUTO-RTO: 0.2 % (ref 0–2)
PH UR STRIP: 7 [PH] (ref 5–8)
PLATELET # BLD EST: (no result) 10*3/UL
PLATELET # BLD: 111 K/UL (ref 130–400)
PMV BLD AUTO: 7.9 FL (ref 7.2–11.7)
PROT UR STRIP-MCNC: NEGATIVE MG/DL
PROTHROMBIN TIME: 18.9 SECONDS (ref 9.7–12.2)
RBC # BLD AUTO: 3.25 MIL/UL (ref 4.4–5.9)
RBC # UR STRIP: NEGATIVE /UL
SP GR UR STRIP: 1.01 (ref 1–1.03)
SQUAMOUS EPITHIAL: 1 /HPF (ref 0–5)
TOTAL CELLS COUNTED BLD: 100
UROBILINOGEN UR-MCNC: 2 MG/DL (ref 0.2–1)
WBC # BLD AUTO: 2.7 K/UL (ref 4.8–10.8)

## 2019-03-19 LAB
ALBUMIN SERPL-MCNC: 2.4 G/DL (ref 3.5–5)
ALBUMIN/GLOB SERPL: 0.6 {RATIO} (ref 1–2.1)
ALT SERPL-CCNC: 27 U/L (ref 21–72)
AST SERPL-CCNC: 91 U/L (ref 17–59)
BUN SERPL-MCNC: 7 MG/DL (ref 9–20)
CALCIUM SERPL-MCNC: 8.2 MG/DL (ref 8.6–10.4)
GFR NON-AFRICAN AMERICAN: > 60

## 2019-03-19 RX ADMIN — Medication SCH MG: at 17:05

## 2019-03-19 RX ADMIN — Medication SCH ML: at 09:47

## 2019-03-19 RX ADMIN — MAGNESIUM SULFATE IN DEXTROSE SCH MLS/HR: 10 INJECTION, SOLUTION INTRAVENOUS at 13:45

## 2019-03-19 RX ADMIN — POTASSIUM CHLORIDE SCH MEQ: 20 TABLET, EXTENDED RELEASE ORAL at 09:47

## 2019-03-19 RX ADMIN — Medication SCH MG: at 09:46

## 2019-03-19 RX ADMIN — MAGNESIUM SULFATE IN DEXTROSE SCH MLS/HR: 10 INJECTION, SOLUTION INTRAVENOUS at 13:54

## 2019-03-19 RX ADMIN — Medication SCH MG: at 13:59

## 2019-03-20 LAB
ALBUMIN SERPL-MCNC: 2.6 G/DL (ref 3.5–5)
ALBUMIN/GLOB SERPL: 0.6 {RATIO} (ref 1–2.1)
ALT SERPL-CCNC: 32 U/L (ref 21–72)
AST SERPL-CCNC: 94 U/L (ref 17–59)
BILIRUB DIRECT SERPL-MCNC: 0.5 MG/DL (ref 0–0.4)
BUN SERPL-MCNC: 6 MG/DL (ref 9–20)
CALCIUM SERPL-MCNC: 8 MG/DL (ref 8.6–10.4)
ERYTHROCYTE [DISTWIDTH] IN BLOOD BY AUTOMATED COUNT: 18 % (ref 11.5–14.5)
GFR NON-AFRICAN AMERICAN: > 60
HGB BLD-MCNC: 10.2 G/DL (ref 12–18)
INR PPP: 1.8
MCH RBC QN AUTO: 31.3 PG (ref 27–31)
MCHC RBC AUTO-ENTMCNC: 33.2 G/DL (ref 33–37)
MCV RBC AUTO: 94.3 FL (ref 80–94)
PLATELET # BLD: 129 K/UL (ref 130–400)
PMV BLD AUTO: 7.7 FL (ref 7.2–11.7)
PROTHROMBIN TIME: 19.5 SECONDS (ref 9.7–12.2)
RBC # BLD AUTO: 3.26 MIL/UL (ref 4.4–5.9)
WBC # BLD AUTO: 2.7 K/UL (ref 4.8–10.8)

## 2019-03-20 RX ADMIN — POTASSIUM CHLORIDE SCH MEQ: 20 TABLET, EXTENDED RELEASE ORAL at 09:45

## 2019-03-20 RX ADMIN — Medication SCH MG: at 13:34

## 2019-03-20 RX ADMIN — Medication SCH MG: at 17:48

## 2019-03-20 RX ADMIN — Medication SCH ML: at 09:45

## 2019-03-20 RX ADMIN — Medication SCH MG: at 09:45

## 2019-03-21 VITALS — RESPIRATION RATE: 20 BRPM

## 2019-03-21 LAB
ALBUMIN SERPL-MCNC: 3.2 G/DL (ref 3.5–5)
ALBUMIN/GLOB SERPL: 0.6 {RATIO} (ref 1–2.1)
ALT SERPL-CCNC: 24 U/L (ref 21–72)
AST SERPL-CCNC: 104 U/L (ref 17–59)
BASOPHILS # BLD AUTO: 0 K/UL (ref 0–0.2)
BASOPHILS NFR BLD: 0.6 % (ref 0–2)
BUN SERPL-MCNC: 7 MG/DL (ref 9–20)
CALCIUM SERPL-MCNC: 8.2 MG/DL (ref 8.6–10.4)
EOSINOPHIL # BLD AUTO: 0.2 K/UL (ref 0–0.7)
EOSINOPHIL NFR BLD: 4.1 % (ref 0–4)
ERYTHROCYTE [DISTWIDTH] IN BLOOD BY AUTOMATED COUNT: 17.3 % (ref 11.5–14.5)
GFR NON-AFRICAN AMERICAN: > 60
HGB BLD-MCNC: 11.8 G/DL (ref 12–18)
INR PPP: 1.6
LYMPHOCYTES # BLD AUTO: 2.1 K/UL (ref 1–4.3)
LYMPHOCYTES NFR BLD AUTO: 46.3 % (ref 20–40)
MCH RBC QN AUTO: 31 PG (ref 27–31)
MCHC RBC AUTO-ENTMCNC: 32.9 G/DL (ref 33–37)
MCV RBC AUTO: 94.2 FL (ref 80–94)
MONOCYTES # BLD: 0.8 K/UL (ref 0–0.8)
MONOCYTES NFR BLD: 18.3 % (ref 0–10)
NEUTROPHILS # BLD: 1.4 K/UL (ref 1.8–7)
NEUTROPHILS NFR BLD AUTO: 30.7 % (ref 50–75)
NRBC BLD AUTO-RTO: 0.2 % (ref 0–2)
PLATELET # BLD: 155 K/UL (ref 130–400)
PMV BLD AUTO: 7.7 FL (ref 7.2–11.7)
PROTHROMBIN TIME: 17.5 SECONDS (ref 9.7–12.2)
RBC # BLD AUTO: 3.81 MIL/UL (ref 4.4–5.9)
WBC # BLD AUTO: 4.4 K/UL (ref 4.8–10.8)

## 2019-03-21 RX ADMIN — PANTOPRAZOLE SODIUM SCH MG: 40 TABLET, DELAYED RELEASE ORAL at 17:00

## 2019-03-21 RX ADMIN — Medication SCH MG: at 10:40

## 2019-03-21 RX ADMIN — POTASSIUM CHLORIDE SCH MEQ: 20 TABLET, EXTENDED RELEASE ORAL at 10:40

## 2019-03-21 RX ADMIN — Medication SCH ML: at 10:40

## 2019-03-21 RX ADMIN — Medication SCH MG: at 14:09

## 2019-03-21 RX ADMIN — Medication SCH MG: at 17:00

## 2019-03-22 LAB
ALBUMIN SERPL-MCNC: 2.3 G/DL (ref 3.5–5)
ALBUMIN/GLOB SERPL: 0.5 {RATIO} (ref 1–2.1)
ALT SERPL-CCNC: 24 U/L (ref 21–72)
ANISOCYTOSIS BLD QL SMEAR: SLIGHT
AST SERPL-CCNC: 71 U/L (ref 17–59)
BASOPHILS # BLD AUTO: 0 K/UL (ref 0–0.2)
BASOPHILS NFR BLD: 0.8 % (ref 0–2)
BILIRUB DIRECT SERPL-MCNC: 0.8 MG/DL (ref 0–0.4)
BUN SERPL-MCNC: 5 MG/DL (ref 9–20)
CALCIUM SERPL-MCNC: 7.8 MG/DL (ref 8.6–10.4)
EOSINOPHIL # BLD AUTO: 0.1 K/UL (ref 0–0.7)
EOSINOPHIL NFR BLD: 3 % (ref 0–4)
EOSINOPHIL NFR BLD: 4.4 % (ref 0–4)
ERYTHROCYTE [DISTWIDTH] IN BLOOD BY AUTOMATED COUNT: 17.3 % (ref 11.5–14.5)
GFR NON-AFRICAN AMERICAN: > 60
HGB BLD-MCNC: 9.9 G/DL (ref 12–18)
INR PPP: 1.7
LYMPHOCYTE: 36 % (ref 20–40)
LYMPHOCYTES # BLD AUTO: 0.8 K/UL (ref 1–4.3)
LYMPHOCYTES NFR BLD AUTO: 39.7 % (ref 20–40)
MCH RBC QN AUTO: 31.8 PG (ref 27–31)
MCHC RBC AUTO-ENTMCNC: 33.3 G/DL (ref 33–37)
MCV RBC AUTO: 95.2 FL (ref 80–94)
MONOCYTE: 24 % (ref 0–10)
MONOCYTES # BLD: 0.5 K/UL (ref 0–0.8)
MONOCYTES NFR BLD: 22.8 % (ref 0–10)
NEUTROPHILS # BLD: 0.7 K/UL (ref 1.8–7)
NEUTROPHILS NFR BLD AUTO: 32.3 % (ref 50–75)
NEUTROPHILS NFR BLD AUTO: 37 % (ref 50–75)
NRBC BLD AUTO-RTO: 0.3 % (ref 0–2)
PLATELET # BLD EST: (no result) 10*3/UL
PLATELET # BLD: 115 K/UL (ref 130–400)
PMV BLD AUTO: 8 FL (ref 7.2–11.7)
PROTHROMBIN TIME: 18.3 SECONDS (ref 9.7–12.2)
RBC # BLD AUTO: 3.11 MIL/UL (ref 4.4–5.9)
TOTAL CELLS COUNTED BLD: 100
WBC # BLD AUTO: 2.1 K/UL (ref 4.8–10.8)

## 2019-03-22 RX ADMIN — POTASSIUM CHLORIDE SCH MEQ: 20 TABLET, EXTENDED RELEASE ORAL at 09:16

## 2019-03-22 RX ADMIN — Medication SCH ML: at 09:17

## 2019-03-22 RX ADMIN — Medication SCH MG: at 09:16

## 2019-03-22 RX ADMIN — Medication SCH MG: at 13:23

## 2019-03-22 RX ADMIN — Medication SCH MG: at 18:07

## 2019-03-22 RX ADMIN — PANTOPRAZOLE SODIUM SCH MG: 40 TABLET, DELAYED RELEASE ORAL at 06:23

## 2019-03-22 RX ADMIN — PANTOPRAZOLE SODIUM SCH MG: 40 TABLET, DELAYED RELEASE ORAL at 18:06

## 2019-03-23 VITALS — DIASTOLIC BLOOD PRESSURE: 70 MMHG | SYSTOLIC BLOOD PRESSURE: 113 MMHG

## 2019-03-23 VITALS — HEART RATE: 89 BPM | TEMPERATURE: 98.1 F | OXYGEN SATURATION: 98 %

## 2019-03-23 LAB
ALBUMIN SERPL-MCNC: 2.2 G/DL (ref 3.5–5)
ALBUMIN/GLOB SERPL: 0.5 {RATIO} (ref 1–2.1)
ALT SERPL-CCNC: 28 U/L (ref 21–72)
AST SERPL-CCNC: 61 U/L (ref 17–59)
BASOPHILS # BLD AUTO: 0 K/UL (ref 0–0.2)
BASOPHILS NFR BLD: 0.2 % (ref 0–2)
BUN SERPL-MCNC: 7 MG/DL (ref 9–20)
CALCIUM SERPL-MCNC: 7.8 MG/DL (ref 8.6–10.4)
EOSINOPHIL # BLD AUTO: 0.1 K/UL (ref 0–0.7)
EOSINOPHIL NFR BLD: 3.2 % (ref 0–4)
ERYTHROCYTE [DISTWIDTH] IN BLOOD BY AUTOMATED COUNT: 17 % (ref 11.5–14.5)
GFR NON-AFRICAN AMERICAN: > 60
HGB BLD-MCNC: 9.9 G/DL (ref 12–18)
LYMPHOCYTES # BLD AUTO: 0.9 K/UL (ref 1–4.3)
LYMPHOCYTES NFR BLD AUTO: 20.2 % (ref 20–40)
MCH RBC QN AUTO: 31.2 PG (ref 27–31)
MCHC RBC AUTO-ENTMCNC: 32.7 G/DL (ref 33–37)
MCV RBC AUTO: 95.3 FL (ref 80–94)
MONOCYTES # BLD: 0.7 K/UL (ref 0–0.8)
MONOCYTES NFR BLD: 16.1 % (ref 0–10)
NEUTROPHILS # BLD: 2.6 K/UL (ref 1.8–7)
NEUTROPHILS NFR BLD AUTO: 60.3 % (ref 50–75)
NRBC BLD AUTO-RTO: 0.1 % (ref 0–2)
PLATELET # BLD: 109 K/UL (ref 130–400)
PMV BLD AUTO: 7.7 FL (ref 7.2–11.7)
RBC # BLD AUTO: 3.17 MIL/UL (ref 4.4–5.9)
WBC # BLD AUTO: 4.3 K/UL (ref 4.8–10.8)

## 2019-03-23 RX ADMIN — Medication SCH MG: at 13:00

## 2019-03-23 RX ADMIN — PANTOPRAZOLE SODIUM SCH MG: 40 TABLET, DELAYED RELEASE ORAL at 05:32

## 2019-03-23 RX ADMIN — Medication SCH MG: at 09:41

## 2019-03-23 RX ADMIN — Medication SCH ML: at 09:52

## 2019-03-23 RX ADMIN — POTASSIUM CHLORIDE SCH MEQ: 20 TABLET, EXTENDED RELEASE ORAL at 09:41

## 2019-04-06 ENCOUNTER — HOSPITAL ENCOUNTER (EMERGENCY)
Dept: HOSPITAL 31 - C.ER | Age: 49
LOS: 1 days | Discharge: HOME | End: 2019-04-07
Payer: MEDICARE

## 2019-04-06 VITALS — BODY MASS INDEX: 48.9 KG/M2

## 2019-04-06 VITALS — TEMPERATURE: 98 F

## 2019-04-06 DIAGNOSIS — Z72.89: Primary | ICD-10-CM

## 2019-04-07 VITALS
DIASTOLIC BLOOD PRESSURE: 80 MMHG | HEART RATE: 82 BPM | OXYGEN SATURATION: 97 % | SYSTOLIC BLOOD PRESSURE: 132 MMHG | RESPIRATION RATE: 22 BRPM